# Patient Record
Sex: FEMALE | Race: WHITE | Employment: UNEMPLOYED | URBAN - NONMETROPOLITAN AREA
[De-identification: names, ages, dates, MRNs, and addresses within clinical notes are randomized per-mention and may not be internally consistent; named-entity substitution may affect disease eponyms.]

---

## 2018-01-24 ENCOUNTER — APPOINTMENT (OUTPATIENT)
Dept: CT IMAGING | Age: 58
End: 2018-01-24
Payer: MEDICAID

## 2018-01-24 ENCOUNTER — HOSPITAL ENCOUNTER (EMERGENCY)
Age: 58
Discharge: HOME | End: 2018-01-24
Attending: EMERGENCY MEDICINE
Payer: MEDICAID

## 2018-01-24 VITALS
HEIGHT: 67 IN | HEART RATE: 64 BPM | OXYGEN SATURATION: 98 % | WEIGHT: 140 LBS | DIASTOLIC BLOOD PRESSURE: 88 MMHG | RESPIRATION RATE: 16 BRPM | SYSTOLIC BLOOD PRESSURE: 160 MMHG | TEMPERATURE: 98 F | BODY MASS INDEX: 21.97 KG/M2

## 2018-01-24 DIAGNOSIS — R51.9 NONINTRACTABLE HEADACHE, UNSPECIFIED CHRONICITY PATTERN, UNSPECIFIED HEADACHE TYPE: Primary | ICD-10-CM

## 2018-01-24 PROCEDURE — 6360000002 HC RX W HCPCS: Performed by: EMERGENCY MEDICINE

## 2018-01-24 PROCEDURE — 6370000000 HC RX 637 (ALT 250 FOR IP): Performed by: EMERGENCY MEDICINE

## 2018-01-24 PROCEDURE — 99284 EMERGENCY DEPT VISIT MOD MDM: CPT

## 2018-01-24 PROCEDURE — 70450 CT HEAD/BRAIN W/O DYE: CPT

## 2018-01-24 PROCEDURE — 96372 THER/PROPH/DIAG INJ SC/IM: CPT

## 2018-01-24 PROCEDURE — 99283 EMERGENCY DEPT VISIT LOW MDM: CPT | Performed by: EMERGENCY MEDICINE

## 2018-01-24 RX ORDER — MORPHINE SULFATE 10 MG/ML
5 INJECTION, SOLUTION INTRAMUSCULAR; INTRAVENOUS ONCE
Status: COMPLETED | OUTPATIENT
Start: 2018-01-24 | End: 2018-01-24

## 2018-01-24 RX ORDER — ONDANSETRON 4 MG/1
4 TABLET, ORALLY DISINTEGRATING ORAL EVERY 8 HOURS PRN
Qty: 30 TABLET | Refills: 0 | Status: SHIPPED | OUTPATIENT
Start: 2018-01-24 | End: 2018-10-25

## 2018-01-24 RX ORDER — KETOROLAC TROMETHAMINE 30 MG/ML
30 INJECTION, SOLUTION INTRAMUSCULAR; INTRAVENOUS ONCE
Status: COMPLETED | OUTPATIENT
Start: 2018-01-24 | End: 2018-01-24

## 2018-01-24 RX ORDER — SUMATRIPTAN 6 MG/.5ML
6 INJECTION, SOLUTION SUBCUTANEOUS ONCE
Status: COMPLETED | OUTPATIENT
Start: 2018-01-24 | End: 2018-01-24

## 2018-01-24 RX ORDER — HYDROCODONE BITARTRATE AND ACETAMINOPHEN 5; 325 MG/1; MG/1
1 TABLET ORAL EVERY 6 HOURS PRN
Qty: 5 TABLET | Refills: 0 | Status: SHIPPED | OUTPATIENT
Start: 2018-01-24 | End: 2018-01-27

## 2018-01-24 RX ORDER — ONDANSETRON 4 MG/1
4 TABLET, ORALLY DISINTEGRATING ORAL ONCE
Status: COMPLETED | OUTPATIENT
Start: 2018-01-24 | End: 2018-01-24

## 2018-01-24 RX ORDER — DIPHENHYDRAMINE HYDROCHLORIDE 50 MG/ML
25 INJECTION INTRAMUSCULAR; INTRAVENOUS ONCE
Status: DISCONTINUED | OUTPATIENT
Start: 2018-01-24 | End: 2018-01-25 | Stop reason: HOSPADM

## 2018-01-24 RX ORDER — NAPROXEN 500 MG/1
500 TABLET ORAL 2 TIMES DAILY WITH MEALS
Qty: 60 TABLET | Refills: 0 | Status: SHIPPED | OUTPATIENT
Start: 2018-01-24 | End: 2018-10-25

## 2018-01-24 RX ORDER — OXYCODONE HYDROCHLORIDE AND ACETAMINOPHEN 5; 325 MG/1; MG/1
1 TABLET ORAL ONCE
Status: COMPLETED | OUTPATIENT
Start: 2018-01-24 | End: 2018-01-24

## 2018-01-24 RX ORDER — DEXAMETHASONE SODIUM PHOSPHATE 10 MG/ML
10 INJECTION, SOLUTION INTRAMUSCULAR; INTRAVENOUS ONCE
Status: COMPLETED | OUTPATIENT
Start: 2018-01-24 | End: 2018-01-24

## 2018-01-24 RX ADMIN — DEXAMETHASONE SODIUM PHOSPHATE 10 MG: 10 INJECTION, SOLUTION INTRAMUSCULAR; INTRAVENOUS at 21:10

## 2018-01-24 RX ADMIN — MORPHINE SULFATE 5 MG: 10 INJECTION INTRAVENOUS at 21:10

## 2018-01-24 RX ADMIN — MORPHINE SULFATE 5 MG: 10 INJECTION INTRAVENOUS at 21:46

## 2018-01-24 RX ADMIN — KETOROLAC TROMETHAMINE 30 MG: 30 INJECTION, SOLUTION INTRAMUSCULAR at 21:10

## 2018-01-24 RX ADMIN — SUMATRIPTAN SUCCINATE 6 MG: 6 INJECTION SUBCUTANEOUS at 20:44

## 2018-01-24 RX ADMIN — OXYCODONE HYDROCHLORIDE AND ACETAMINOPHEN 1 TABLET: 5; 325 TABLET ORAL at 21:46

## 2018-01-24 RX ADMIN — ONDANSETRON 4 MG: 4 TABLET, ORALLY DISINTEGRATING ORAL at 20:44

## 2018-01-24 ASSESSMENT — PAIN SCALES - GENERAL
PAINLEVEL_OUTOF10: 10
PAINLEVEL_OUTOF10: 7
PAINLEVEL_OUTOF10: 10

## 2018-01-24 ASSESSMENT — ENCOUNTER SYMPTOMS
ABDOMINAL PAIN: 0
EYE PAIN: 0
SHORTNESS OF BREATH: 0
PHOTOPHOBIA: 1
VOICE CHANGE: 0
VOMITING: 1
COUGH: 0
NAUSEA: 1
TROUBLE SWALLOWING: 0

## 2018-01-24 ASSESSMENT — PAIN DESCRIPTION - DESCRIPTORS: DESCRIPTORS: ACHING

## 2018-01-24 ASSESSMENT — PAIN DESCRIPTION - FREQUENCY: FREQUENCY: CONTINUOUS

## 2018-01-24 ASSESSMENT — PAIN DESCRIPTION - LOCATION: LOCATION: HEAD

## 2018-01-24 ASSESSMENT — PAIN DESCRIPTION - PAIN TYPE: TYPE: ACUTE PAIN

## 2018-01-25 NOTE — ED NOTES
Patient states that she is no better after imitrex shot; she was offered an IV but refused.  MD notifed     Viviana Miller RN  01/24/18 9583

## 2018-01-25 NOTE — ED PROVIDER NOTES
[facility]  eMERGENCY dEPARTMENT eNCOUnter      Pt Name: Olivia Zhao  MRN: [id number]  [place] 1960  Date of evaluation: 1/24/2018  Provider: Audelia David MD    [address] [place] [address]       Chief Complaint   Patient presents with    Migraine     started yesterday    Emesis         HISTORY OF PRESENT ILLNESS   (Location/Symptom, Timing/Onset, Context/Setting, Quality, Duration, Modifying Factors, Severity)  Note limiting factors. Olivia Zhao is a 61 y.o. female who presents to the emergency department With headache and migraine that started yesterday afternoon. The patient states his been brought on by stress from the [employer] high [employer] shooting. She states it's been a long time since she had a migraine. It is a 10 out of 10 she's had nausea and vomiting she has some photophobia associated with it she has not been sick no fever she has no syncope as a throbbing sensation on the left side of her head she states this is a migraine she doesn't need a head CT she's had these in the past again she thinks stress brought on she has no syncope she has no ataxia she has no weakness in her arms or legs. The patient is asking for some Imitrex she states that worked in the past. She is allergic to Phenergan. The patient otherwise is neurologically normal.    The history is provided by the patient. Nursing Notes were reviewed. REVIEW OF SYSTEMS    (2-9 systems for level 4, 10 or more for level 5)     Review of Systems   Constitutional: Negative for fever. HENT: Negative for trouble swallowing and voice change. Eyes: Positive for photophobia. Negative for pain. Respiratory: Negative for cough and shortness of breath. Cardiovascular: Negative for chest pain. Gastrointestinal: Positive for nausea and vomiting. Negative for abdominal pain. Neurological: Positive for headaches.  Negative for dizziness, tremors, seizures, syncope, facial asymmetry, speech difficulty, weakness, light-headedness and numbness. Psychiatric/Behavioral: Negative for confusion. A complete review of systems was performed and is negative except as noted above in the HPI. PAST MEDICAL HISTORY     Past Medical History:   Diagnosis Date    Arthritis     Fracture          SURGICAL HISTORY       Past Surgical History:   Procedure Laterality Date     SECTION      CHOLECYSTECTOMY      HYSTERECTOMY           CURRENT MEDICATIONS       Previous Medications    No medications on file       ALLERGIES     Phenergan [promethazine hcl]    FAMILY HISTORY     History reviewed. No pertinent family history. SOCIAL HISTORY       Social History     Social History    Marital status:      Spouse name: N/A    Number of children: N/A    Years of education: N/A     Social History Main Topics    Smoking status: Never Smoker    Smokeless tobacco: Never Used    Alcohol use Yes    Drug use: No    Sexual activity: Not Asked     Other Topics Concern    None     Social History Narrative    None       SCREENINGS             PHYSICAL EXAM    (up to 7 for level 4, 8 or more for level 5)     ED Triage Vitals [18]   BP Temp Temp Source Pulse Resp SpO2 Height Weight   (!) 174/92 97.8 °F (36.6 °C) Oral 72 17 99 % 5' 7\" (1.702 m) 140 lb (63.5 kg)       Physical Exam   Constitutional: She is oriented to person, place, and time. She appears well-developed and well-nourished. In pain lights out in room   HENT:   Head: Normocephalic and atraumatic. Eyes: EOM are normal. Pupils are equal, round, and reactive to light. Neck: Normal range of motion. Neck supple. Cardiovascular: Normal rate, regular rhythm and normal heart sounds. Pulmonary/Chest: Effort normal and breath sounds normal.   Abdominal: Soft. There is no tenderness. Musculoskeletal: Normal range of motion. She exhibits no edema. Neurological: She is alert and oriented to person, place, and time.  No cranial nerve deficit or home.     Needs to get back in to a doctor and pain management. Feels better, looks better neuro normal.       10:39 PM  Much improved ct neg, will get pcp outpatient to follow up as well. Amount and/or Complexity of Data Reviewed  Tests in the radiology section of CPT®: ordered and reviewed    Patient Progress  Patient progress: improved        CONSULTS:  None    PROCEDURES:  Unless otherwise noted below, none     Procedures    FINAL IMPRESSION      1. Nonintractable headache, unspecified chronicity pattern, unspecified headache type          DISPOSITION/PLAN   DISPOSITION Discharge - Pending Orders Complete 01/24/2018 10:28:24 PM      PATIENT REFERRED TO:  Germaine Neumann MD  [address] [place] [address] [address] [facility] [phone]    Schedule an appointment as soon as possible for a visit   to get a primary doctor [facility] [address] [id number] [phone]  Schedule an appointment as soon as possible for a visit   to get a primary doctor      DISCHARGE MEDICATIONS:  New Prescriptions    HYDROCODONE-ACETAMINOPHEN (NORCO) 5-325 MG PER TABLET    Take 1 tablet by mouth every 6 hours as needed for Pain for up to 3 days . Take lowest dose possible to manage pain. NAPROXEN (NAPROSYN) 500 MG TABLET    Take 1 tablet by mouth 2 times daily (with meals)    ONDANSETRON (ZOFRAN ODT) 4 MG DISINTEGRATING TABLET    Take 1 tablet by mouth every 8 hours as needed for Nausea or Vomiting     Attestation: The Prescription Monitoring Report for this patient was reviewed today. ([id number]) Sobia Haynes MD)  Documentation: Possible medication side effects, risk of tolerance and/or dependence, and alternative treatments discussed., No signs of potential drug abuse or diversion identified.  Sobia Haynes MD)    (Please note that portions of this note were completed with a voice recognition program.  Efforts were made to edit the dictations but occasionally words are mis-transcribed.)    Preet Barnes MD (electronically signed)  Attending Emergency Physician         Preet Barnes MD  01/24/18 0060

## 2018-10-25 ENCOUNTER — HOSPITAL ENCOUNTER (INPATIENT)
Age: 58
LOS: 3 days | Discharge: HOME | DRG: 378 | End: 2018-10-28
Attending: EMERGENCY MEDICINE | Admitting: INTERNAL MEDICINE
Payer: MEDICAID

## 2018-10-25 ENCOUNTER — APPOINTMENT (OUTPATIENT)
Dept: GENERAL RADIOLOGY | Age: 58
DRG: 378 | End: 2018-10-25
Payer: MEDICAID

## 2018-10-25 ENCOUNTER — APPOINTMENT (OUTPATIENT)
Dept: CT IMAGING | Age: 58
DRG: 378 | End: 2018-10-25
Payer: MEDICAID

## 2018-10-25 DIAGNOSIS — K92.2 UPPER GI BLEED: Primary | ICD-10-CM

## 2018-10-25 DIAGNOSIS — R07.9 CHEST PAIN, UNSPECIFIED TYPE: ICD-10-CM

## 2018-10-25 PROBLEM — E86.0 DEHYDRATION, MILD: Status: ACTIVE | Noted: 2018-10-25

## 2018-10-25 PROBLEM — R07.89 CHEST PRESSURE: Status: ACTIVE | Noted: 2018-10-25

## 2018-10-25 PROBLEM — E87.6 HYPOKALEMIA: Status: ACTIVE | Noted: 2018-10-25

## 2018-10-25 PROBLEM — G89.29 CHRONIC LOW BACK PAIN: Chronic | Status: ACTIVE | Noted: 2018-10-25

## 2018-10-25 PROBLEM — R50.9 FEVER: Status: ACTIVE | Noted: 2018-10-25

## 2018-10-25 PROBLEM — M54.50 CHRONIC LOW BACK PAIN: Chronic | Status: ACTIVE | Noted: 2018-10-25

## 2018-10-25 PROBLEM — D64.89 OTHER SPECIFIED ANEMIAS: Status: ACTIVE | Noted: 2018-10-25

## 2018-10-25 PROBLEM — E87.3 ACUTE RESPIRATORY ALKALOSIS: Status: ACTIVE | Noted: 2018-10-25

## 2018-10-25 PROBLEM — D62 ACUTE BLOOD LOSS ANEMIA: Status: ACTIVE | Noted: 2018-10-25

## 2018-10-25 LAB
ABO/RH: NORMAL
ALBUMIN SERPL-MCNC: 4.3 G/DL (ref 3.5–5.2)
ALP BLD-CCNC: 90 U/L (ref 35–104)
ALT SERPL-CCNC: 21 U/L (ref 5–33)
ANION GAP SERPL CALCULATED.3IONS-SCNC: 17 MMOL/L (ref 7–19)
ANTIBODY SCREEN: NORMAL
AST SERPL-CCNC: 20 U/L (ref 5–32)
BASE EXCESS ARTERIAL: 1.5 MMOL/L (ref -2–2)
BASOPHILS ABSOLUTE: 0 K/UL (ref 0–0.2)
BASOPHILS RELATIVE PERCENT: 0.2 % (ref 0–1)
BILIRUB SERPL-MCNC: 0.5 MG/DL (ref 0.2–1.2)
BILIRUBIN URINE: ABNORMAL
BLOOD, URINE: ABNORMAL
BUN BLDV-MCNC: 42 MG/DL (ref 6–20)
C DIFFICILE TOXIN, EIA: NORMAL
CALCIUM SERPL-MCNC: 9.6 MG/DL (ref 8.6–10)
CARBOXYHEMOGLOBIN ARTERIAL: 1.8 % (ref 0–5)
CHLORIDE BLD-SCNC: 98 MMOL/L (ref 98–111)
CLARITY: CLEAR
CO2: 23 MMOL/L (ref 22–29)
COLOR: YELLOW
CREAT SERPL-MCNC: 0.7 MG/DL (ref 0.5–0.9)
EOSINOPHILS ABSOLUTE: 0 K/UL (ref 0–0.6)
EOSINOPHILS RELATIVE PERCENT: 0.1 % (ref 0–5)
GFR NON-AFRICAN AMERICAN: >60
GLUCOSE BLD-MCNC: 193 MG/DL (ref 74–109)
GLUCOSE URINE: NEGATIVE MG/DL
HCO3 ARTERIAL: 24.2 MMOL/L (ref 22–26)
HCT VFR BLD CALC: 33.3 % (ref 37–47)
HEMOGLOBIN, ART, EXTENDED: 11.4 G/DL (ref 12–16)
HEMOGLOBIN: 10.8 G/DL (ref 12–16)
KETONES, URINE: 15 MG/DL
LACTIC ACID: 1.3 MMOL/L (ref 0.5–1.9)
LEUKOCYTE ESTERASE, URINE: ABNORMAL
LYMPHOCYTES ABSOLUTE: 1.3 K/UL (ref 1.1–4.5)
LYMPHOCYTES RELATIVE PERCENT: 11.9 % (ref 20–40)
MCH RBC QN AUTO: 29.8 PG (ref 27–31)
MCHC RBC AUTO-ENTMCNC: 32.4 G/DL (ref 33–37)
MCV RBC AUTO: 91.7 FL (ref 81–99)
METHEMOGLOBIN ARTERIAL: 1.2 %
MONOCYTES ABSOLUTE: 0.4 K/UL (ref 0–0.9)
MONOCYTES RELATIVE PERCENT: 3.8 % (ref 0–10)
NEUTROPHILS ABSOLUTE: 8.8 K/UL (ref 1.5–7.5)
NEUTROPHILS RELATIVE PERCENT: 83.4 % (ref 50–65)
NITRITE, URINE: NEGATIVE
O2 CONTENT ARTERIAL: 15.3 ML/DL
O2 SAT, ARTERIAL: 94.9 %
O2 THERAPY: ABNORMAL
PCO2 ARTERIAL: 31 MMHG (ref 35–45)
PDW BLD-RTO: 13.7 % (ref 11.5–14.5)
PERFORMED ON: NORMAL
PERFORMED ON: NORMAL
PH ARTERIAL: 7.5 (ref 7.35–7.45)
PH UA: 5.5
PLATELET # BLD: 437 K/UL (ref 130–400)
PMV BLD AUTO: 9 FL (ref 9.4–12.3)
PO2 ARTERIAL: 77 MMHG (ref 80–100)
POC TROPONIN I: 0 NG/ML (ref 0–0.08)
POC TROPONIN I: 0.07 NG/ML (ref 0–0.08)
POTASSIUM SERPL-SCNC: 3.1 MMOL/L (ref 3.5–5)
POTASSIUM, WHOLE BLOOD: 3.1
PRO-BNP: 576 PG/ML (ref 0–900)
PROTEIN UA: 30 MG/DL
RBC # BLD: 3.63 M/UL (ref 4.2–5.4)
SODIUM BLD-SCNC: 138 MMOL/L (ref 136–145)
SPECIFIC GRAVITY UA: 1.01
TOTAL PROTEIN: 7.4 G/DL (ref 6.6–8.7)
URINE REFLEX TO CULTURE: YES
UROBILINOGEN, URINE: 0.2 E.U./DL
WBC # BLD: 10.6 K/UL (ref 4.8–10.8)

## 2018-10-25 PROCEDURE — 99285 EMERGENCY DEPT VISIT HI MDM: CPT | Performed by: NURSE PRACTITIONER

## 2018-10-25 PROCEDURE — 87086 URINE CULTURE/COLONY COUNT: CPT

## 2018-10-25 PROCEDURE — 1210000000 HC MED SURG R&B

## 2018-10-25 PROCEDURE — 93005 ELECTROCARDIOGRAM TRACING: CPT

## 2018-10-25 PROCEDURE — 6360000002 HC RX W HCPCS: Performed by: CLINICAL NURSE SPECIALIST

## 2018-10-25 PROCEDURE — 80053 COMPREHEN METABOLIC PANEL: CPT

## 2018-10-25 PROCEDURE — 6370000000 HC RX 637 (ALT 250 FOR IP): Performed by: NURSE PRACTITIONER

## 2018-10-25 PROCEDURE — 6360000004 HC RX CONTRAST MEDICATION: Performed by: NURSE PRACTITIONER

## 2018-10-25 PROCEDURE — 99285 EMERGENCY DEPT VISIT HI MDM: CPT

## 2018-10-25 PROCEDURE — 86901 BLOOD TYPING SEROLOGIC RH(D): CPT

## 2018-10-25 PROCEDURE — 82803 BLOOD GASES ANY COMBINATION: CPT

## 2018-10-25 PROCEDURE — 96375 TX/PRO/DX INJ NEW DRUG ADDON: CPT

## 2018-10-25 PROCEDURE — C9113 INJ PANTOPRAZOLE SODIUM, VIA: HCPCS | Performed by: NURSE PRACTITIONER

## 2018-10-25 PROCEDURE — 71275 CT ANGIOGRAPHY CHEST: CPT

## 2018-10-25 PROCEDURE — 71046 X-RAY EXAM CHEST 2 VIEWS: CPT

## 2018-10-25 PROCEDURE — 86850 RBC ANTIBODY SCREEN: CPT

## 2018-10-25 PROCEDURE — 83605 ASSAY OF LACTIC ACID: CPT

## 2018-10-25 PROCEDURE — 36415 COLL VENOUS BLD VENIPUNCTURE: CPT

## 2018-10-25 PROCEDURE — 84132 ASSAY OF SERUM POTASSIUM: CPT

## 2018-10-25 PROCEDURE — 6360000002 HC RX W HCPCS

## 2018-10-25 PROCEDURE — 6360000002 HC RX W HCPCS: Performed by: NURSE PRACTITIONER

## 2018-10-25 PROCEDURE — 83880 ASSAY OF NATRIURETIC PEPTIDE: CPT

## 2018-10-25 PROCEDURE — 85025 COMPLETE CBC W/AUTO DIFF WBC: CPT

## 2018-10-25 PROCEDURE — 86900 BLOOD TYPING SEROLOGIC ABO: CPT

## 2018-10-25 PROCEDURE — 2580000003 HC RX 258: Performed by: CLINICAL NURSE SPECIALIST

## 2018-10-25 PROCEDURE — 81003 URINALYSIS AUTO W/O SCOPE: CPT

## 2018-10-25 PROCEDURE — 36600 WITHDRAWAL OF ARTERIAL BLOOD: CPT

## 2018-10-25 PROCEDURE — 87324 CLOSTRIDIUM AG IA: CPT

## 2018-10-25 PROCEDURE — 2580000003 HC RX 258: Performed by: NURSE PRACTITIONER

## 2018-10-25 PROCEDURE — 6370000000 HC RX 637 (ALT 250 FOR IP): Performed by: CLINICAL NURSE SPECIALIST

## 2018-10-25 PROCEDURE — 84484 ASSAY OF TROPONIN QUANT: CPT

## 2018-10-25 PROCEDURE — 96376 TX/PRO/DX INJ SAME DRUG ADON: CPT

## 2018-10-25 PROCEDURE — 96374 THER/PROPH/DIAG INJ IV PUSH: CPT

## 2018-10-25 PROCEDURE — 87040 BLOOD CULTURE FOR BACTERIA: CPT

## 2018-10-25 RX ORDER — MORPHINE SULFATE/0.9% NACL/PF 1 MG/ML
4 SYRINGE (ML) INJECTION ONCE
Status: COMPLETED | OUTPATIENT
Start: 2018-10-25 | End: 2018-10-25

## 2018-10-25 RX ORDER — GABAPENTIN 600 MG/1
600 TABLET ORAL
Status: DISCONTINUED | OUTPATIENT
Start: 2018-10-26 | End: 2018-10-28 | Stop reason: HOSPADM

## 2018-10-25 RX ORDER — SODIUM CHLORIDE 0.9 % (FLUSH) 0.9 %
10 SYRINGE (ML) INJECTION PRN
Status: DISCONTINUED | OUTPATIENT
Start: 2018-10-25 | End: 2018-10-28 | Stop reason: HOSPADM

## 2018-10-25 RX ORDER — ONDANSETRON 2 MG/ML
INJECTION INTRAMUSCULAR; INTRAVENOUS
Status: COMPLETED
Start: 2018-10-25 | End: 2018-10-25

## 2018-10-25 RX ORDER — OXYCODONE AND ACETAMINOPHEN 7.5; 325 MG/1; MG/1
1 TABLET ORAL EVERY 4 HOURS PRN
Status: ON HOLD | COMMUNITY
End: 2019-05-30 | Stop reason: ALTCHOICE

## 2018-10-25 RX ORDER — PANTOPRAZOLE SODIUM 40 MG/10ML
40 INJECTION, POWDER, LYOPHILIZED, FOR SOLUTION INTRAVENOUS DAILY
Status: DISCONTINUED | OUTPATIENT
Start: 2018-10-25 | End: 2018-10-25

## 2018-10-25 RX ORDER — SODIUM CHLORIDE 9 MG/ML
INJECTION, SOLUTION INTRAVENOUS CONTINUOUS
Status: DISCONTINUED | OUTPATIENT
Start: 2018-10-25 | End: 2018-10-28

## 2018-10-25 RX ORDER — ACETAMINOPHEN 325 MG/1
650 TABLET ORAL ONCE
Status: DISCONTINUED | OUTPATIENT
Start: 2018-10-25 | End: 2018-10-28 | Stop reason: HOSPADM

## 2018-10-25 RX ORDER — ACETAMINOPHEN 325 MG/1
650 TABLET ORAL EVERY 4 HOURS PRN
Status: DISCONTINUED | OUTPATIENT
Start: 2018-10-25 | End: 2018-10-28 | Stop reason: HOSPADM

## 2018-10-25 RX ORDER — ONDANSETRON 2 MG/ML
4 INJECTION INTRAMUSCULAR; INTRAVENOUS ONCE
Status: COMPLETED | OUTPATIENT
Start: 2018-10-25 | End: 2018-10-25

## 2018-10-25 RX ORDER — POTASSIUM BICARBONATE 25 MEQ/1
25 TABLET, EFFERVESCENT ORAL ONCE
Status: COMPLETED | OUTPATIENT
Start: 2018-10-25 | End: 2018-10-25

## 2018-10-25 RX ORDER — GABAPENTIN 600 MG/1
600 TABLET ORAL 2 TIMES DAILY
COMMUNITY

## 2018-10-25 RX ORDER — SODIUM CHLORIDE 0.9 % (FLUSH) 0.9 %
10 SYRINGE (ML) INJECTION EVERY 12 HOURS SCHEDULED
Status: DISCONTINUED | OUTPATIENT
Start: 2018-10-25 | End: 2018-10-28 | Stop reason: HOSPADM

## 2018-10-25 RX ORDER — ONDANSETRON 2 MG/ML
4 INJECTION INTRAMUSCULAR; INTRAVENOUS EVERY 6 HOURS PRN
Status: DISCONTINUED | OUTPATIENT
Start: 2018-10-25 | End: 2018-10-28 | Stop reason: HOSPADM

## 2018-10-25 RX ORDER — ASPIRIN 325 MG
325 TABLET ORAL ONCE
Status: DISCONTINUED | OUTPATIENT
Start: 2018-10-25 | End: 2018-10-25

## 2018-10-25 RX ORDER — 0.9 % SODIUM CHLORIDE 0.9 %
250 INTRAVENOUS SOLUTION INTRAVENOUS ONCE
Status: COMPLETED | OUTPATIENT
Start: 2018-10-25 | End: 2018-10-25

## 2018-10-25 RX ORDER — NITROGLYCERIN 0.4 MG/1
0.4 TABLET SUBLINGUAL EVERY 5 MIN PRN
Status: DISCONTINUED | OUTPATIENT
Start: 2018-10-25 | End: 2018-10-28 | Stop reason: HOSPADM

## 2018-10-25 RX ADMIN — Medication 4 MG: at 11:55

## 2018-10-25 RX ADMIN — IOPAMIDOL 90 ML: 755 INJECTION, SOLUTION INTRAVENOUS at 12:41

## 2018-10-25 RX ADMIN — Medication 0.5 MG: at 19:21

## 2018-10-25 RX ADMIN — SODIUM CHLORIDE: 9 INJECTION, SOLUTION INTRAVENOUS at 19:14

## 2018-10-25 RX ADMIN — NITROGLYCERIN 0.4 MG: 0.4 TABLET, ORALLY DISINTEGRATING SUBLINGUAL at 09:28

## 2018-10-25 RX ADMIN — ONDANSETRON 4 MG: 2 INJECTION INTRAMUSCULAR; INTRAVENOUS at 09:00

## 2018-10-25 RX ADMIN — SODIUM CHLORIDE 250 ML: 9 INJECTION, SOLUTION INTRAVENOUS at 09:00

## 2018-10-25 RX ADMIN — POTASSIUM BICARBONATE 25 MEQ: 25 TABLET, EFFERVESCENT ORAL at 19:36

## 2018-10-25 RX ADMIN — SODIUM CHLORIDE 8 MG/HR: 9 INJECTION, SOLUTION INTRAVENOUS at 16:06

## 2018-10-25 RX ADMIN — Medication 4 MG: at 16:17

## 2018-10-25 RX ADMIN — Medication 4 MG: at 08:49

## 2018-10-25 ASSESSMENT — PAIN SCALES - GENERAL
PAINLEVEL_OUTOF10: 8
PAINLEVEL_OUTOF10: 0
PAINLEVEL_OUTOF10: 8
PAINLEVEL_OUTOF10: 4
PAINLEVEL_OUTOF10: 8
PAINLEVEL_OUTOF10: 7
PAINLEVEL_OUTOF10: 9
PAINLEVEL_OUTOF10: 8

## 2018-10-25 ASSESSMENT — ENCOUNTER SYMPTOMS
ABDOMINAL PAIN: 1
SHORTNESS OF BREATH: 1
ANAL BLEEDING: 1
DIARRHEA: 1
NAUSEA: 1
EYES NEGATIVE: 1

## 2018-10-25 ASSESSMENT — HEART SCORE: ECG: 1

## 2018-10-25 NOTE — ED TRIAGE NOTES
PT presents to ED via EMS from home for complaint of chest pain that began yesterday. PT reports a dull ache in the center of her chest, rates pain 8/10 at this time. PT received 325 mg aspirin and 1 SL nitro via EMS PTA. PT also reports nausea and shortness of breath.

## 2018-10-25 NOTE — ED PROVIDER NOTES
for Pain. Nataly Jon ALLERGIES     Benadryl [diphenhydramine] and Phenergan [promethazine hcl]    FAMILY HISTORY     No family history on file. SOCIAL HISTORY       Social History     Social History    Marital status:      Spouse name: N/A    Number of children: N/A    Years of education: N/A     Social History Main Topics    Smoking status: Never Smoker    Smokeless tobacco: Never Used    Alcohol use Yes    Drug use: No    Sexual activity: Not on file     Other Topics Concern    Not on file     Social History Narrative    No narrative on file       SCREENINGS      Heart Score for chest pain patients  History: Slightly Suspicious  ECG: Non-Specifc repolarization disturbance/LBTB/PM  Patient Age: > 39 and < 65 years  *Risk factors for Atherosclerotic disease: Positive family History  Risk Factors: 1 or 2 risk factors  Troponin: < 1X normal limit  Heart Score Total: 3      PHYSICAL EXAM    (up to 7 for level 4, 8 or more for level 5)     ED Triage Vitals [10/25/18 0817]   BP Temp Temp Source Pulse Resp SpO2 Height Weight   129/88 98.6 °F (37 °C) Oral 104 18 93 % 5' 7\" (1.702 m) 140 lb (63.5 kg)       Physical Exam   Constitutional: She is oriented to person, place, and time. She appears well-nourished. HENT:   Head: Normocephalic. Right Ear: External ear normal.   Left Ear: External ear normal.   Mouth/Throat: Oropharynx is clear and moist.   Eyes: EOM are normal.   Neck: Normal range of motion. Cardiovascular: Normal rate and regular rhythm. Pulmonary/Chest: Effort normal.   Abdominal: Soft. There is no tenderness. Genitourinary: Rectal exam shows guaiac positive stool. Genitourinary Comments: Melena stool noted   Musculoskeletal: Normal range of motion. Neurological: She is alert and oriented to person, place, and time. Vitals reviewed.       DIAGNOSTIC RESULTS     EKG: All EKG's are interpreted by the Emergency Department Physician who either signs or Co-signs this chart in the

## 2018-10-25 NOTE — PLAN OF CARE
Problem: Activity:  Goal: Risk for activity intolerance will decrease  Risk for activity intolerance will decrease  Outcome: Ongoing      Problem:  Bowel/Gastric:  Goal: Bowel function will improve  Bowel function will improve  Outcome: Ongoing    Goal: Diagnostic test results will improve  Diagnostic test results will improve  Outcome: Ongoing    Goal: Occurrences of nausea will decrease  Occurrences of nausea will decrease  Outcome: Ongoing    Goal: Occurrences of vomiting will decrease  Occurrences of vomiting will decrease  Outcome: Ongoing      Problem: Fluid Volume:  Goal: Maintenance of adequate hydration will improve  Maintenance of adequate hydration will improve  Outcome: Ongoing      Problem: Health Behavior:  Goal: Ability to state signs and symptoms to report to health care provider will improve  Ability to state signs and symptoms to report to health care provider will improve  Outcome: Ongoing      Problem: Physical Regulation:  Goal: Complications related to the disease process, condition or treatment will be avoided or minimized  Complications related to the disease process, condition or treatment will be avoided or minimized  Outcome: Ongoing    Goal: Ability to maintain clinical measurements within normal limits will improve  Ability to maintain clinical measurements within normal limits will improve  Outcome: Ongoing

## 2018-10-26 ENCOUNTER — ANESTHESIA EVENT (OUTPATIENT)
Dept: ENDOSCOPY | Age: 58
DRG: 378 | End: 2018-10-26
Payer: MEDICAID

## 2018-10-26 ENCOUNTER — ANESTHESIA (OUTPATIENT)
Dept: ENDOSCOPY | Age: 58
DRG: 378 | End: 2018-10-26
Payer: MEDICAID

## 2018-10-26 VITALS
SYSTOLIC BLOOD PRESSURE: 121 MMHG | OXYGEN SATURATION: 98 % | RESPIRATION RATE: 15 BRPM | DIASTOLIC BLOOD PRESSURE: 60 MMHG

## 2018-10-26 LAB
ANION GAP SERPL CALCULATED.3IONS-SCNC: 11 MMOL/L (ref 7–19)
BUN BLDV-MCNC: 20 MG/DL (ref 6–20)
CALCIUM SERPL-MCNC: 8.4 MG/DL (ref 8.6–10)
CHLORIDE BLD-SCNC: 105 MMOL/L (ref 98–111)
CO2: 22 MMOL/L (ref 22–29)
CREAT SERPL-MCNC: 0.6 MG/DL (ref 0.5–0.9)
EKG P AXIS: 18 DEGREES
EKG P AXIS: 60 DEGREES
EKG P AXIS: 9 DEGREES
EKG P-R INTERVAL: 114 MS
EKG P-R INTERVAL: 122 MS
EKG P-R INTERVAL: 134 MS
EKG Q-T INTERVAL: 396 MS
EKG Q-T INTERVAL: 408 MS
EKG Q-T INTERVAL: 412 MS
EKG QRS DURATION: 100 MS
EKG QRS DURATION: 106 MS
EKG QRS DURATION: 96 MS
EKG QTC CALCULATION (BAZETT): 443 MS
EKG QTC CALCULATION (BAZETT): 453 MS
EKG QTC CALCULATION (BAZETT): 458 MS
EKG T AXIS: 36 DEGREES
EKG T AXIS: 39 DEGREES
EKG T AXIS: 41 DEGREES
FERRITIN: 101.8 NG/ML (ref 13–150)
GFR NON-AFRICAN AMERICAN: >60
GLUCOSE BLD-MCNC: 108 MG/DL (ref 74–109)
HCT VFR BLD CALC: 23.4 % (ref 37–47)
HCT VFR BLD CALC: 24 % (ref 37–47)
HCT VFR BLD CALC: 25.2 % (ref 37–47)
HEMOGLOBIN: 7.4 G/DL (ref 12–16)
HEMOGLOBIN: 7.6 G/DL (ref 12–16)
HEMOGLOBIN: 8.1 G/DL (ref 12–16)
IRON SATURATION: 28 % (ref 14–50)
IRON: 71 UG/DL (ref 37–145)
MCH RBC QN AUTO: 30.1 PG (ref 27–31)
MCH RBC QN AUTO: 30.2 PG (ref 27–31)
MCH RBC QN AUTO: 30.3 PG (ref 27–31)
MCHC RBC AUTO-ENTMCNC: 31.6 G/DL (ref 33–37)
MCHC RBC AUTO-ENTMCNC: 31.7 G/DL (ref 33–37)
MCHC RBC AUTO-ENTMCNC: 32.1 G/DL (ref 33–37)
MCV RBC AUTO: 93.7 FL (ref 81–99)
MCV RBC AUTO: 95.2 FL (ref 81–99)
MCV RBC AUTO: 95.9 FL (ref 81–99)
PDW BLD-RTO: 14.3 % (ref 11.5–14.5)
PDW BLD-RTO: 14.3 % (ref 11.5–14.5)
PDW BLD-RTO: 14.4 % (ref 11.5–14.5)
PLATELET # BLD: 313 K/UL (ref 130–400)
PLATELET # BLD: 328 K/UL (ref 130–400)
PLATELET # BLD: 358 K/UL (ref 130–400)
PMV BLD AUTO: 8.9 FL (ref 9.4–12.3)
PMV BLD AUTO: 9 FL (ref 9.4–12.3)
PMV BLD AUTO: 9.2 FL (ref 9.4–12.3)
POTASSIUM REFLEX MAGNESIUM: 3.8 MMOL/L (ref 3.5–5)
RBC # BLD: 2.44 M/UL (ref 4.2–5.4)
RBC # BLD: 2.52 M/UL (ref 4.2–5.4)
RBC # BLD: 2.69 M/UL (ref 4.2–5.4)
SODIUM BLD-SCNC: 138 MMOL/L (ref 136–145)
TOTAL IRON BINDING CAPACITY: 256 UG/DL (ref 250–400)
WBC # BLD: 10.2 K/UL (ref 4.8–10.8)
WBC # BLD: 10.9 K/UL (ref 4.8–10.8)
WBC # BLD: 11.2 K/UL (ref 4.8–10.8)

## 2018-10-26 PROCEDURE — 7100000001 HC PACU RECOVERY - ADDTL 15 MIN: Performed by: INTERNAL MEDICINE

## 2018-10-26 PROCEDURE — 6370000000 HC RX 637 (ALT 250 FOR IP): Performed by: CLINICAL NURSE SPECIALIST

## 2018-10-26 PROCEDURE — 0DB78ZX EXCISION OF STOMACH, PYLORUS, VIA NATURAL OR ARTIFICIAL OPENING ENDOSCOPIC, DIAGNOSTIC: ICD-10-PCS | Performed by: INTERNAL MEDICINE

## 2018-10-26 PROCEDURE — 2500000003 HC RX 250 WO HCPCS: Performed by: NURSE ANESTHETIST, CERTIFIED REGISTERED

## 2018-10-26 PROCEDURE — 36415 COLL VENOUS BLD VENIPUNCTURE: CPT

## 2018-10-26 PROCEDURE — 2580000003 HC RX 258: Performed by: NURSE PRACTITIONER

## 2018-10-26 PROCEDURE — 83540 ASSAY OF IRON: CPT

## 2018-10-26 PROCEDURE — 88342 IMHCHEM/IMCYTCHM 1ST ANTB: CPT

## 2018-10-26 PROCEDURE — 6360000002 HC RX W HCPCS: Performed by: NURSE PRACTITIONER

## 2018-10-26 PROCEDURE — 83550 IRON BINDING TEST: CPT

## 2018-10-26 PROCEDURE — 6360000002 HC RX W HCPCS: Performed by: CLINICAL NURSE SPECIALIST

## 2018-10-26 PROCEDURE — 6360000002 HC RX W HCPCS: Performed by: NURSE ANESTHETIST, CERTIFIED REGISTERED

## 2018-10-26 PROCEDURE — 2580000003 HC RX 258: Performed by: NURSE ANESTHETIST, CERTIFIED REGISTERED

## 2018-10-26 PROCEDURE — 85027 COMPLETE CBC AUTOMATED: CPT

## 2018-10-26 PROCEDURE — 3700000000 HC ANESTHESIA ATTENDED CARE: Performed by: INTERNAL MEDICINE

## 2018-10-26 PROCEDURE — 2709999900 HC NON-CHARGEABLE SUPPLY: Performed by: INTERNAL MEDICINE

## 2018-10-26 PROCEDURE — 6360000002 HC RX W HCPCS: Performed by: INTERNAL MEDICINE

## 2018-10-26 PROCEDURE — C9113 INJ PANTOPRAZOLE SODIUM, VIA: HCPCS | Performed by: INTERNAL MEDICINE

## 2018-10-26 PROCEDURE — 6370000000 HC RX 637 (ALT 250 FOR IP): Performed by: INTERNAL MEDICINE

## 2018-10-26 PROCEDURE — 3609012400 HC EGD TRANSORAL BIOPSY SINGLE/MULTIPLE: Performed by: INTERNAL MEDICINE

## 2018-10-26 PROCEDURE — 2580000003 HC RX 258: Performed by: INTERNAL MEDICINE

## 2018-10-26 PROCEDURE — 82728 ASSAY OF FERRITIN: CPT

## 2018-10-26 PROCEDURE — 99233 SBSQ HOSP IP/OBS HIGH 50: CPT | Performed by: INTERNAL MEDICINE

## 2018-10-26 PROCEDURE — 2580000003 HC RX 258: Performed by: CLINICAL NURSE SPECIALIST

## 2018-10-26 PROCEDURE — 3700000001 HC ADD 15 MINUTES (ANESTHESIA): Performed by: INTERNAL MEDICINE

## 2018-10-26 PROCEDURE — 1210000000 HC MED SURG R&B

## 2018-10-26 PROCEDURE — 88305 TISSUE EXAM BY PATHOLOGIST: CPT

## 2018-10-26 PROCEDURE — 7100000000 HC PACU RECOVERY - FIRST 15 MIN: Performed by: INTERNAL MEDICINE

## 2018-10-26 PROCEDURE — 93005 ELECTROCARDIOGRAM TRACING: CPT

## 2018-10-26 PROCEDURE — 80048 BASIC METABOLIC PNL TOTAL CA: CPT

## 2018-10-26 PROCEDURE — C9113 INJ PANTOPRAZOLE SODIUM, VIA: HCPCS | Performed by: NURSE PRACTITIONER

## 2018-10-26 RX ORDER — FENTANYL CITRATE 50 UG/ML
INJECTION, SOLUTION INTRAMUSCULAR; INTRAVENOUS PRN
Status: DISCONTINUED | OUTPATIENT
Start: 2018-10-26 | End: 2018-10-26 | Stop reason: SDUPTHER

## 2018-10-26 RX ORDER — PANTOPRAZOLE SODIUM 40 MG/10ML
40 INJECTION, POWDER, LYOPHILIZED, FOR SOLUTION INTRAVENOUS 2 TIMES DAILY
Status: DISCONTINUED | OUTPATIENT
Start: 2018-10-26 | End: 2018-10-28 | Stop reason: HOSPADM

## 2018-10-26 RX ORDER — SUCRALFATE 1 G/1
1 TABLET ORAL EVERY 6 HOURS SCHEDULED
Status: DISCONTINUED | OUTPATIENT
Start: 2018-10-26 | End: 2018-10-28 | Stop reason: HOSPADM

## 2018-10-26 RX ORDER — PROPOFOL 10 MG/ML
INJECTION, EMULSION INTRAVENOUS PRN
Status: DISCONTINUED | OUTPATIENT
Start: 2018-10-26 | End: 2018-10-26 | Stop reason: SDUPTHER

## 2018-10-26 RX ORDER — LIDOCAINE HYDROCHLORIDE 10 MG/ML
INJECTION, SOLUTION INFILTRATION; PERINEURAL PRN
Status: DISCONTINUED | OUTPATIENT
Start: 2018-10-26 | End: 2018-10-26 | Stop reason: SDUPTHER

## 2018-10-26 RX ORDER — OXYCODONE AND ACETAMINOPHEN 7.5; 325 MG/1; MG/1
1 TABLET ORAL EVERY 6 HOURS PRN
Status: DISCONTINUED | OUTPATIENT
Start: 2018-10-26 | End: 2018-10-27

## 2018-10-26 RX ORDER — ONDANSETRON 2 MG/ML
4 INJECTION INTRAMUSCULAR; INTRAVENOUS
Status: DISCONTINUED | OUTPATIENT
Start: 2018-10-26 | End: 2018-10-26 | Stop reason: HOSPADM

## 2018-10-26 RX ORDER — SODIUM CHLORIDE, SODIUM LACTATE, POTASSIUM CHLORIDE, CALCIUM CHLORIDE 600; 310; 30; 20 MG/100ML; MG/100ML; MG/100ML; MG/100ML
INJECTION, SOLUTION INTRAVENOUS CONTINUOUS PRN
Status: DISCONTINUED | OUTPATIENT
Start: 2018-10-26 | End: 2018-10-26 | Stop reason: SDUPTHER

## 2018-10-26 RX ADMIN — OXYCODONE HYDROCHLORIDE AND ACETAMINOPHEN 1 TABLET: 7.5; 325 TABLET ORAL at 23:31

## 2018-10-26 RX ADMIN — Medication 0.5 MG: at 00:02

## 2018-10-26 RX ADMIN — PANTOPRAZOLE SODIUM 40 MG: 40 INJECTION, POWDER, FOR SOLUTION INTRAVENOUS at 11:40

## 2018-10-26 RX ADMIN — FENTANYL CITRATE 50 MCG: 50 INJECTION INTRAMUSCULAR; INTRAVENOUS at 09:53

## 2018-10-26 RX ADMIN — SUCRALFATE 1 G: 1 TABLET ORAL at 23:31

## 2018-10-26 RX ADMIN — SUCRALFATE 1 G: 1 TABLET ORAL at 11:40

## 2018-10-26 RX ADMIN — Medication 0.5 MG: at 08:12

## 2018-10-26 RX ADMIN — Medication 10 ML: at 08:13

## 2018-10-26 RX ADMIN — GABAPENTIN 600 MG: 600 TABLET, FILM COATED ORAL at 20:24

## 2018-10-26 RX ADMIN — GABAPENTIN 600 MG: 600 TABLET, FILM COATED ORAL at 13:08

## 2018-10-26 RX ADMIN — Medication 10 ML: at 20:24

## 2018-10-26 RX ADMIN — SODIUM CHLORIDE: 9 INJECTION, SOLUTION INTRAVENOUS at 13:08

## 2018-10-26 RX ADMIN — OXYCODONE HYDROCHLORIDE AND ACETAMINOPHEN 1 TABLET: 7.5; 325 TABLET ORAL at 17:53

## 2018-10-26 RX ADMIN — OXYCODONE HYDROCHLORIDE AND ACETAMINOPHEN 1 TABLET: 7.5; 325 TABLET ORAL at 11:48

## 2018-10-26 RX ADMIN — PROPOFOL 50 MG: 10 INJECTION, EMULSION INTRAVENOUS at 09:58

## 2018-10-26 RX ADMIN — GABAPENTIN 600 MG: 600 TABLET, FILM COATED ORAL at 08:12

## 2018-10-26 RX ADMIN — Medication 0.5 MG: at 03:59

## 2018-10-26 RX ADMIN — GABAPENTIN 600 MG: 600 TABLET, FILM COATED ORAL at 17:53

## 2018-10-26 RX ADMIN — SODIUM CHLORIDE, SODIUM LACTATE, POTASSIUM CHLORIDE, AND CALCIUM CHLORIDE: 600; 310; 30; 20 INJECTION, SOLUTION INTRAVENOUS at 09:52

## 2018-10-26 RX ADMIN — PANTOPRAZOLE SODIUM 40 MG: 40 INJECTION, POWDER, FOR SOLUTION INTRAVENOUS at 20:24

## 2018-10-26 RX ADMIN — Medication 1 MG: at 20:24

## 2018-10-26 RX ADMIN — PROPOFOL 70 MG: 10 INJECTION, EMULSION INTRAVENOUS at 09:53

## 2018-10-26 RX ADMIN — SODIUM CHLORIDE: 9 INJECTION, SOLUTION INTRAVENOUS at 23:32

## 2018-10-26 RX ADMIN — SODIUM CHLORIDE 8 MG/HR: 9 INJECTION, SOLUTION INTRAVENOUS at 00:22

## 2018-10-26 RX ADMIN — SUCRALFATE 1 G: 1 TABLET ORAL at 17:53

## 2018-10-26 RX ADMIN — LIDOCAINE HYDROCHLORIDE 50 MG: 10 INJECTION, SOLUTION INFILTRATION; PERINEURAL at 09:53

## 2018-10-26 ASSESSMENT — PAIN SCALES - GENERAL
PAINLEVEL_OUTOF10: 8
PAINLEVEL_OUTOF10: 3
PAINLEVEL_OUTOF10: 7
PAINLEVEL_OUTOF10: 9
PAINLEVEL_OUTOF10: 8
PAINLEVEL_OUTOF10: 7
PAINLEVEL_OUTOF10: 8
PAINLEVEL_OUTOF10: 3
PAINLEVEL_OUTOF10: 8

## 2018-10-26 ASSESSMENT — ENCOUNTER SYMPTOMS
EYES NEGATIVE: 1
WHEEZING: 1
BACK PAIN: 1
DIARRHEA: 1
ABDOMINAL PAIN: 1

## 2018-10-26 ASSESSMENT — PAIN DESCRIPTION - PAIN TYPE: TYPE: ACUTE PAIN

## 2018-10-26 ASSESSMENT — PAIN DESCRIPTION - LOCATION: LOCATION: ABDOMEN

## 2018-10-26 NOTE — PLAN OF CARE
Problem: Nutrition  Goal: Optimal nutrition therapy  Nutrition Problem: Inadequate oral intake  Intervention: Food and/or Nutrient Delivery: Continue current diet, Start ONS  Nutritional Goals: Pt will consume 50% or more of all meals and supplements    Outcome: Ongoing

## 2018-10-26 NOTE — PLAN OF CARE
Problem: Activity:  Goal: Risk for activity intolerance will decrease  Risk for activity intolerance will decrease   Outcome: Ongoing      Problem:  Bowel/Gastric:  Goal: Occurrences of nausea will decrease  Occurrences of nausea will decrease   Outcome: Ongoing    Goal: Occurrences of vomiting will decrease  Occurrences of vomiting will decrease   Outcome: Ongoing      Problem: Fluid Volume:  Goal: Maintenance of adequate hydration will improve  Maintenance of adequate hydration will improve   Outcome: Ongoing      Problem: Physical Regulation:  Goal: Ability to maintain clinical measurements within normal limits will improve  Ability to maintain clinical measurements within normal limits will improve   Outcome: Ongoing

## 2018-10-26 NOTE — PROGRESS NOTES
Lauri Will, APRN        Wheeler Jamil Hold] acetaminophen (TYLENOL) tablet 650 mg  650 mg Oral Q4H PRN Lauri Will, APRN        Wheeler Jamil Hold] ondansetron [facility]) injection 4 mg  4 mg Intravenous Q6H PRN Lauri Will, APRN        Wheeler Jamil Hold] 0.9 % sodium chloride infusion   Intravenous Continuous Lauri Will, APRN 150 mL/hr at 10/25/18 1914      [MAR Hold] HYDROmorphone (DILAUDID) injection 0.25 mg  0.25 mg Intravenous Q3H PRN Lauri Will, APRN        Or   Gladis Polite Hold] HYDROmorphone (DILAUDID) injection 0.5 mg  0.5 mg Intravenous Q3H PRN Lauri Will, APRN   0.5 mg at 10/26/18 6528    [MAR Hold] gabapentin (NEURONTIN) tablet 600 mg  600 mg Oral 4x Daily PC & HS Lauri Will, APRN   600 mg at 10/26/18 5577     Facility-Administered Medications Ordered in Other Encounters   Medication Dose Route Frequency Provider Last Rate Last Dose    propofol injection    PRN Toñito Carey, APRN - CRNA   50 mg at 10/26/18 6974    lidocaine 1 % injection    PRN Toñito Carey, APRN - CRNA   50 mg at 10/26/18 7247    fentaNYL (SUBLIMAZE) injection    PRN Toñito Carey, APRN - CRNA   50 mcg at 10/26/18 7501    lactated ringers infusion    Continuous PRN Toñito [place], APRN - CRNA        FentaNYL Waste 50 mcg    Continuous PRN Toñito Carey, APRN - CRNA            Labs:     Recent Labs      10/25/18   0824  10/25/18   2356   WBC  10.6  11.2*   RBC  3.63*  2.69*   HGB  10.8*  8.1*   HCT  33.3*  25.2*   MCV  91.7  93.7   MCH  29.8  30.1   MCHC  32.4*  32.1*   PLT  437*  358     Recent Labs      10/25/18   0824  10/25/18   0903  10/26/18   0233   NA  138   --   138   K  3.1*  3.1  3.8   ANIONGAP  17   --   11   CL  98   --   105   CO2  23   --   22   BUN  42*   --   20   CREATININE  0.7   --   0.6   GLUCOSE  193*   --   108   CALCIUM  9.6   --   8.4*     No results for input(s): MG, PHOS in the last 72 hours.   Recent Labs      10/25/18   0824   AST  20   ALT  21   BILITOT  0.5   ALKPHOS  90     ABGs:  Recent Labs      10/25/18   0903   PHART 7.500*   XBR3ENW  31.0*   PO2ART  77.0*   OIB9NEG  24.2   BEART  1.5   HGBAE  11.4*   I5PGGACU  94.9   CARBOXHGBART  1.8   02THERAPY  Unknown     HgBA1c: No results for input(s): LABA1C in the last 72 hours. FLP:  No results found for: TRIG, HDL, LDLCALC, LDLDIRECT, LABVLDL  TSH:  No results found for: TSH  Troponin T:   Recent Labs      10/25/18   0842  10/25/18   1114   TROPONINI  0.07  0.00     INR: No results for input(s): INR in the last 72 hours. Objective:   Vitals: /66   Pulse 86   Temp 98.8 °F (37.1 °C) (Temporal)   Resp 17   Ht 5' 7\" (1.702 m)   Wt 140 lb (63.5 kg)   SpO2 94%   BMI 21.93 kg/m²   24HR INTAKE/OUTPUT:    Intake/Output Summary (Last 24 hours) at 10/26/18 1001  Last data filed at 10/26/18 1000   Gross per 24 hour   Intake           1724.5 ml   Output             1250 ml   Net            474.5 ml     General appearance: NAD, alert and cooperative with exam  HEENT: atraumatic, PERRLA, EOMI, anicteric, trachea midline  Lungs: no increased work of breathing, CTAB, no wheezing/rhonchi/rales  Heart: RRR, +s1/s2, no rub  Abdomen: soft, nt/nd, +BS, no rebound/gaurding  Extremities: atraumatic, no edema, no cyanosis   Neurologic: AAOx3, no focal deficits  Skin: no rashes  Psych: Normal affect      Assessment and Plan:   Principal Problem:    Acute UGIB - from PUD and ulcers, s/p EGD this AM without any active bleeding noted. HGB dropped overnight from 10.8 to 8.1. Awaiting stat repeat cbc. Hemodynamically stable. Continue cbc q6 hrs. Check type and screen. Transfuse for hgb <7 or if pt develops chest pain or any has any further bleeding prior to next cbc. Can change ppi gtt to iv ppi bid and plan for oral ppi bid at d/c. Start carafate qid.      Active Problems:    Chest pressure - ekg non-acute and trop negative, likely secondary to PUD, monitor on tele    Hypokalemia - improved    Dehydration, mild - s/p ivf resus    Acute blood loss anemia - secondary to PUD, monitor    Chronic low back pain - continue home pain meds       Advance Directive: Full Code          Electronically signed by Gregg Elias DO on 10/26/2018 at 10:01 AM

## 2018-10-26 NOTE — CONSULTS
[facility] [place]  Cardiology Consult    Requesting MD:  Berenice Das DO Admit Status:  Inpatient       History obtained from:   [x] Patient  [x] Other (specify): Chart      Chief Complaint: Melena and diarrhea for several days  Chief Complaint   Patient presents with    Chest Pain     x2 days    Shortness of Breath    Nausea     This is a 69-year-old lady on occasion,  has had episodes of epigastric pain associated with dark stool. Last episode was 2 months ago and now recurrent for the last 3 days; she has not sought medical attention prior to this. She presented to the emergency room hemoglobin initially in the 10 range currently 8.1 may represent a dilutional factor. Patient's diarrhea is described as black and loose for several days days;  now with dark brown and loose. Her usual evacuations daily brown and formed. Patient takes no NSAIDs, no oral IRON,  no Pepto-Bismol Review of Systems:  Review of Systems   Constitutional: Negative. HENT: Negative. Eyes: Negative. Respiratory: Positive for wheezing. Patient has wheezing on occasion   Cardiovascular: Negative. Gastrointestinal: Positive for abdominal pain and diarrhea.         stool black and loose associated with low hemoglobin of 8   Endocrine: Negative. Genitourinary: Negative. Musculoskeletal: Positive for back pain. Rotator cuff injury awaiting surgery       Past Medical History:  Past Medical History:   Diagnosis Date    Arthritis     Fracture         Past Surgical History:  Past Surgical History:   Procedure Laterality Date     SECTION      CHOLECYSTECTOMY      HYSTERECTOMY         Past Family History:  Family History   Problem Relation Age of Onset    Heart Attack Mother     Diabetes Mother     Heart Disease Father     Heart Failure Father     Diabetes Father        Past Social History:  Social History     Social History    Marital status:       Spouse name: N/A   Aydin Gonzalez effusion. The thoracic aorta appears normal in caliber. There is no evidence of aortic dissection. The main pulmonary artery appears normal in caliber. The pulmonary arteries are well opacified, and there are no filling defects to suggest PE. The lungs appear clear. Review of the visualized portion of the upper abdomen demonstrates no acute abnormalities. Review of the visualized osseous structures demonstrates no acute or aggressive lesions. Degenerative changes are noted in the spine. Impression: 1. No evidence of PE or acute aortic pathology. 2. No acute findings in the chest. Signed by Dr Yasir Thomas on 10/25/2018 12:59 PM          Assessment and Plan:  Patient has had a long history of epigastric pain associated with intermittent bouts of melena. On this admission had had 3 day history of melanotic stool associated with epigastric pain hemoglobin 8  range.   This represents most likely an upper GI source of bleeding planning for EGD today keeping  patient n.p.o.      Ministerio Meraz MD 10/26/2018 8:24 AM  [facility]

## 2018-10-26 NOTE — PROGRESS NOTES
Was reported to me by charge nurse that telemetry tech called to report patient had a 12 second run of afib in 170's, checked patient and she has no complaints, will notify Dr Electronically signed by Crystal Jordan RN on 10/25/2018 at 8:22 PM

## 2018-10-27 LAB
ANION GAP SERPL CALCULATED.3IONS-SCNC: 7 MMOL/L (ref 7–19)
BLOOD BANK DISPENSE STATUS: NORMAL
BLOOD BANK PRODUCT CODE: NORMAL
BPU ID: NORMAL
BUN BLDV-MCNC: 9 MG/DL (ref 6–20)
CALCIUM SERPL-MCNC: 8.6 MG/DL (ref 8.6–10)
CHLORIDE BLD-SCNC: 105 MMOL/L (ref 98–111)
CO2: 29 MMOL/L (ref 22–29)
CREAT SERPL-MCNC: 0.5 MG/DL (ref 0.5–0.9)
DESCRIPTION BLOOD BANK: NORMAL
GFR NON-AFRICAN AMERICAN: >60
GLUCOSE BLD-MCNC: 110 MG/DL (ref 74–109)
HCT VFR BLD CALC: 22.8 % (ref 37–47)
HCT VFR BLD CALC: 22.9 % (ref 37–47)
HCT VFR BLD CALC: 25.6 % (ref 37–47)
HEMOGLOBIN: 7 G/DL (ref 12–16)
HEMOGLOBIN: 7.1 G/DL (ref 12–16)
HEMOGLOBIN: 8.1 G/DL (ref 12–16)
MCH RBC QN AUTO: 29.7 PG (ref 27–31)
MCH RBC QN AUTO: 30 PG (ref 27–31)
MCH RBC QN AUTO: 30.5 PG (ref 27–31)
MCHC RBC AUTO-ENTMCNC: 30.7 G/DL (ref 33–37)
MCHC RBC AUTO-ENTMCNC: 31 G/DL (ref 33–37)
MCHC RBC AUTO-ENTMCNC: 31.6 G/DL (ref 33–37)
MCV RBC AUTO: 96.2 FL (ref 81–99)
MCV RBC AUTO: 96.6 FL (ref 81–99)
MCV RBC AUTO: 96.6 FL (ref 81–99)
PDW BLD-RTO: 14.6 % (ref 11.5–14.5)
PDW BLD-RTO: 14.6 % (ref 11.5–14.5)
PDW BLD-RTO: 14.8 % (ref 11.5–14.5)
PLATELET # BLD: 281 K/UL (ref 130–400)
PLATELET # BLD: 285 K/UL (ref 130–400)
PLATELET # BLD: 291 K/UL (ref 130–400)
PMV BLD AUTO: 8.9 FL (ref 9.4–12.3)
PMV BLD AUTO: 9 FL (ref 9.4–12.3)
PMV BLD AUTO: 9.2 FL (ref 9.4–12.3)
POTASSIUM SERPL-SCNC: 4.4 MMOL/L (ref 3.5–5)
RBC # BLD: 2.36 M/UL (ref 4.2–5.4)
RBC # BLD: 2.37 M/UL (ref 4.2–5.4)
RBC # BLD: 2.66 M/UL (ref 4.2–5.4)
SODIUM BLD-SCNC: 141 MMOL/L (ref 136–145)
URINE CULTURE, ROUTINE: NORMAL
WBC # BLD: 7.7 K/UL (ref 4.8–10.8)
WBC # BLD: 8.1 K/UL (ref 4.8–10.8)
WBC # BLD: 9.6 K/UL (ref 4.8–10.8)

## 2018-10-27 PROCEDURE — C9113 INJ PANTOPRAZOLE SODIUM, VIA: HCPCS | Performed by: INTERNAL MEDICINE

## 2018-10-27 PROCEDURE — 6370000000 HC RX 637 (ALT 250 FOR IP): Performed by: CLINICAL NURSE SPECIALIST

## 2018-10-27 PROCEDURE — 36430 TRANSFUSION BLD/BLD COMPNT: CPT

## 2018-10-27 PROCEDURE — 36415 COLL VENOUS BLD VENIPUNCTURE: CPT

## 2018-10-27 PROCEDURE — 6370000000 HC RX 637 (ALT 250 FOR IP): Performed by: INTERNAL MEDICINE

## 2018-10-27 PROCEDURE — 99233 SBSQ HOSP IP/OBS HIGH 50: CPT | Performed by: INTERNAL MEDICINE

## 2018-10-27 PROCEDURE — 1210000000 HC MED SURG R&B

## 2018-10-27 PROCEDURE — 80048 BASIC METABOLIC PNL TOTAL CA: CPT

## 2018-10-27 PROCEDURE — 2580000003 HC RX 258: Performed by: INTERNAL MEDICINE

## 2018-10-27 PROCEDURE — 2580000003 HC RX 258: Performed by: CLINICAL NURSE SPECIALIST

## 2018-10-27 PROCEDURE — P9016 RBC LEUKOCYTES REDUCED: HCPCS

## 2018-10-27 PROCEDURE — 6360000002 HC RX W HCPCS: Performed by: INTERNAL MEDICINE

## 2018-10-27 PROCEDURE — 85027 COMPLETE CBC AUTOMATED: CPT

## 2018-10-27 RX ORDER — 0.9 % SODIUM CHLORIDE 0.9 %
250 INTRAVENOUS SOLUTION INTRAVENOUS ONCE
Status: COMPLETED | OUTPATIENT
Start: 2018-10-27 | End: 2018-10-27

## 2018-10-27 RX ORDER — OXYCODONE AND ACETAMINOPHEN 7.5; 325 MG/1; MG/1
1 TABLET ORAL EVERY 4 HOURS PRN
Status: DISCONTINUED | OUTPATIENT
Start: 2018-10-27 | End: 2018-10-28 | Stop reason: HOSPADM

## 2018-10-27 RX ADMIN — GABAPENTIN 600 MG: 600 TABLET, FILM COATED ORAL at 08:52

## 2018-10-27 RX ADMIN — GABAPENTIN 600 MG: 600 TABLET, FILM COATED ORAL at 12:45

## 2018-10-27 RX ADMIN — Medication 1 MG: at 23:06

## 2018-10-27 RX ADMIN — PANTOPRAZOLE SODIUM 40 MG: 40 INJECTION, POWDER, FOR SOLUTION INTRAVENOUS at 21:22

## 2018-10-27 RX ADMIN — SODIUM CHLORIDE: 9 INJECTION, SOLUTION INTRAVENOUS at 15:13

## 2018-10-27 RX ADMIN — SODIUM CHLORIDE 250 ML: 9 INJECTION, SOLUTION INTRAVENOUS at 10:10

## 2018-10-27 RX ADMIN — OXYCODONE HYDROCHLORIDE AND ACETAMINOPHEN 1 TABLET: 7.5; 325 TABLET ORAL at 09:07

## 2018-10-27 RX ADMIN — SUCRALFATE 1 G: 1 TABLET ORAL at 05:39

## 2018-10-27 RX ADMIN — Medication 1 MG: at 00:31

## 2018-10-27 RX ADMIN — SUCRALFATE 1 G: 1 TABLET ORAL at 12:05

## 2018-10-27 RX ADMIN — GABAPENTIN 600 MG: 600 TABLET, FILM COATED ORAL at 21:21

## 2018-10-27 RX ADMIN — Medication 10 ML: at 08:53

## 2018-10-27 RX ADMIN — OXYCODONE HYDROCHLORIDE AND ACETAMINOPHEN 1 TABLET: 7.5; 325 TABLET ORAL at 04:21

## 2018-10-27 RX ADMIN — SUCRALFATE 1 G: 1 TABLET ORAL at 17:58

## 2018-10-27 RX ADMIN — Medication 1 MG: at 05:39

## 2018-10-27 RX ADMIN — Medication 10 ML: at 21:22

## 2018-10-27 RX ADMIN — GABAPENTIN 600 MG: 600 TABLET, FILM COATED ORAL at 17:58

## 2018-10-27 RX ADMIN — SUCRALFATE 1 G: 1 TABLET ORAL at 22:14

## 2018-10-27 RX ADMIN — OXYCODONE HYDROCHLORIDE AND ACETAMINOPHEN 1 TABLET: 7.5; 325 TABLET ORAL at 12:58

## 2018-10-27 RX ADMIN — PANTOPRAZOLE SODIUM 40 MG: 40 INJECTION, POWDER, FOR SOLUTION INTRAVENOUS at 08:52

## 2018-10-27 RX ADMIN — OXYCODONE HYDROCHLORIDE AND ACETAMINOPHEN 1 TABLET: 7.5; 325 TABLET ORAL at 22:14

## 2018-10-27 RX ADMIN — Medication 1 MG: at 15:13

## 2018-10-27 RX ADMIN — OXYCODONE HYDROCHLORIDE AND ACETAMINOPHEN 1 TABLET: 7.5; 325 TABLET ORAL at 18:00

## 2018-10-27 RX ADMIN — Medication 1 MG: at 10:05

## 2018-10-27 RX ADMIN — Medication 1 MG: at 19:15

## 2018-10-27 ASSESSMENT — PAIN SCALES - GENERAL
PAINLEVEL_OUTOF10: 8
PAINLEVEL_OUTOF10: 8
PAINLEVEL_OUTOF10: 7
PAINLEVEL_OUTOF10: 9
PAINLEVEL_OUTOF10: 8
PAINLEVEL_OUTOF10: 9
PAINLEVEL_OUTOF10: 7
PAINLEVEL_OUTOF10: 6
PAINLEVEL_OUTOF10: 9
PAINLEVEL_OUTOF10: 8
PAINLEVEL_OUTOF10: 8

## 2018-10-27 ASSESSMENT — PAIN DESCRIPTION - LOCATION
LOCATION: OTHER (COMMENT)
LOCATION: ABDOMEN
LOCATION: ABDOMEN

## 2018-10-27 ASSESSMENT — PAIN DESCRIPTION - PAIN TYPE
TYPE: ACUTE PAIN

## 2018-10-27 NOTE — PROGRESS NOTES
Progress Note  10/27/2018 1:05 PM  Subjective:   Admit Date: 10/25/2018  PCP: No primary care provider on file. CC: UGIB    Subjective: pt seen and examined, still with some dark stools overnight. Denies any chest pain or sob. No n/v. Tolerated po. Admits to chronic pain which is unchanged. HGB 7 this am, no other overnight events. ROS: 14 point review of systems is negative except as specifically addressed above.     DIET FULL LIQUID;  Dietary Nutrition Supplements: Standard High Calorie Oral Supplement    Intake/Output Summary (Last 24 hours) at 10/27/18 1305  Last data filed at 10/27/18 1249   Gross per 24 hour   Intake          3109.46 ml   Output             1000 ml   Net          2109.46 ml     Medications:   sodium chloride 75 mL/hr at 10/26/18 2332     Current Facility-Administered Medications   Medication Dose Route Frequency Provider Last Rate Last Dose    0.9 % sodium chloride bolus  250 mL Intravenous Once Cooper Grim, DO 20 mL/hr at 10/27/18 1010 250 mL at 10/27/18 1010    HYDROmorphone (DILAUDID) injection 1 mg  1 mg Intravenous Q4H PRN Lacy Pouch Juno, DO   1 mg at 10/27/18 1005    oxyCODONE-acetaminophen (PERCOCET) 7.5-325 MG per tablet 1 tablet  1 tablet Oral Q6H PRN Cooper Grim, DO   1 tablet at 10/27/18 1258    sucralfate (CARAFATE) tablet 1 g  1 g Oral 4 times per day Cooper Grim, DO   1 g at 10/27/18 1205    pantoprazole (PROTONIX) injection 40 mg  40 mg Intravenous BID Lacy Pouch Juno, DO   40 mg at 10/27/18 2190    nitroGLYCERIN (NITROSTAT) SL tablet 0.4 mg  0.4 mg Sublingual Q5 Min PRN Carnella Maged, APRN   0.4 mg at 10/25/18 3372    acetaminophen (TYLENOL) tablet 650 mg  650 mg Oral Once Carnella Maged, APRN        influenza quadrivalent split vaccine (FLUZONE;FLUARIX;FLULAVAL;AFLURIA) injection 0.5 mL  0.5 mL Intramuscular Once Lacy Pouch Juno, DO        sodium chloride flush 0.9 % injection 10 mL  10 mL Intravenous 2 times per day Verl Hence, APRN   10 mL at

## 2018-10-27 NOTE — PROGRESS NOTES
22.9*  22.8*   MCV  95.2  96.6  96.6   MCH  30.2  30.0  29.7   MCHC  31.7*  31.0*  30.7*   PLT  328  291  281     Recent Labs      10/25/18   0824  10/25/18   0903  10/26/18   0233  10/27/18   0651   NA  138   --   138  141   K  3.1*  3.1  3.8  4.4   ANIONGAP  17   --   11  7   CL  98   --   105  105   CO2  23   --   22  29   BUN  42*   --   20  9   CREATININE  0.7   --   0.6  0.5   GLUCOSE  193*   --   108  110*   CALCIUM  9.6   --   8.4*  8.6     No results for input(s): MG, PHOS in the last 72 hours. Recent Labs      10/25/18   0824   AST  20   ALT  21   BILITOT  0.5   ALKPHOS  90     HgBA1c:  No components found for: HGBA1C  FLP:  No results found for: TRIG, HDL, LDLCALC, LDLDIRECT, LABVLDL  TSH:  No results found for: TSH  Troponin T:   Recent Labs      10/25/18   0842  10/25/18   1114   TROPONINI  0.07  0.00     INR: No results for input(s): INR in the last 72 hours. No results for input(s): LIPASE in the last 72 hours. -----------------------------------------------------------------  RAD:       Physical Exam:     Vitals:    10/27/18 0315 10/27/18 0719 10/27/18 1009 10/27/18 1035   BP: 109/66 100/68 110/68 (!) 92/5   Pulse: 88 72 81 96   Resp: 16 16 16 14   Temp: 99.1 °F (37.3 °C) 99.2 °F (37.3 °C) 99.2 °F (37.3 °C) 98.9 °F (37.2 °C)   TempSrc: Temporal Temporal Temporal    SpO2: 96% 96% 98% 97%   Weight:       Height:         24HR INTAKE/OUTPUT:    Intake/Output Summary (Last 24 hours) at 10/27/18 1050  Last data filed at 10/27/18 0422   Gross per 24 hour   Intake          2784.46 ml   Output             1300 ml   Net          1484.46 ml       Physical Exam   Constitutional: She appears well-developed and well-nourished. HENT:   Head: Normocephalic and atraumatic. Eyes: Pupils are equal, round, and reactive to light. EOM are normal.   Neck: Normal range of motion. Neck supple. No tracheal deviation present. Cardiovascular: Normal rate and regular rhythm.     Pulmonary/Chest: Effort normal and breath sounds normal.   Abdominal: Soft. Bowel sounds are normal. There is no tenderness. There is no guarding. Lymphadenopathy:     She has no cervical adenopathy. Impression:      patient does have antral ulcer. She also has erosion opposite the ulcer in the antrum, duodenitis. She is no longer bleeding from her ulcer and her ulcer pain is improved; she is tolerating her meals well in no GI complaints. Plan:   If patient's hemoglobin remains stable;  her stool is brown, he may be discharged from the GI point of view on current GI meds.   she should also  be on an acute and chronic ulcer diet for 6 weeks as an outpatient , all gastric ulcers  should be reevaluated endoscopically within 3 months    dEson Malin MD  10/27/2018 10:50 AM

## 2018-10-27 NOTE — PLAN OF CARE
Problem:  Activity:  Goal: Risk for activity intolerance will decrease  Risk for activity intolerance will decrease   Outcome: Ongoing      Problem: Health Behavior:  Goal: Ability to state signs and symptoms to report to health care provider will improve  Ability to state signs and symptoms to report to health care provider will improve   Outcome: Ongoing

## 2018-10-28 VITALS
DIASTOLIC BLOOD PRESSURE: 82 MMHG | TEMPERATURE: 99.7 F | RESPIRATION RATE: 20 BRPM | WEIGHT: 140 LBS | OXYGEN SATURATION: 96 % | BODY MASS INDEX: 21.97 KG/M2 | SYSTOLIC BLOOD PRESSURE: 148 MMHG | HEIGHT: 67 IN | HEART RATE: 92 BPM

## 2018-10-28 LAB
HCT VFR BLD CALC: 25.2 % (ref 37–47)
HCT VFR BLD CALC: 26.1 % (ref 37–47)
HEMOGLOBIN: 8.1 G/DL (ref 12–16)
HEMOGLOBIN: 8.1 G/DL (ref 12–16)
MCH RBC QN AUTO: 30 PG (ref 27–31)
MCH RBC QN AUTO: 30.8 PG (ref 27–31)
MCHC RBC AUTO-ENTMCNC: 31 G/DL (ref 33–37)
MCHC RBC AUTO-ENTMCNC: 32.1 G/DL (ref 33–37)
MCV RBC AUTO: 95.8 FL (ref 81–99)
MCV RBC AUTO: 96.7 FL (ref 81–99)
PDW BLD-RTO: 15.3 % (ref 11.5–14.5)
PDW BLD-RTO: 15.5 % (ref 11.5–14.5)
PLATELET # BLD: 289 K/UL (ref 130–400)
PLATELET # BLD: 292 K/UL (ref 130–400)
PMV BLD AUTO: 9.2 FL (ref 9.4–12.3)
PMV BLD AUTO: 9.3 FL (ref 9.4–12.3)
RBC # BLD: 2.63 M/UL (ref 4.2–5.4)
RBC # BLD: 2.7 M/UL (ref 4.2–5.4)
WBC # BLD: 7.5 K/UL (ref 4.8–10.8)
WBC # BLD: 8.7 K/UL (ref 4.8–10.8)

## 2018-10-28 PROCEDURE — 99239 HOSP IP/OBS DSCHRG MGMT >30: CPT | Performed by: INTERNAL MEDICINE

## 2018-10-28 PROCEDURE — C9113 INJ PANTOPRAZOLE SODIUM, VIA: HCPCS | Performed by: INTERNAL MEDICINE

## 2018-10-28 PROCEDURE — 6370000000 HC RX 637 (ALT 250 FOR IP): Performed by: INTERNAL MEDICINE

## 2018-10-28 PROCEDURE — 99223 1ST HOSP IP/OBS HIGH 75: CPT | Performed by: CLINICAL NURSE SPECIALIST

## 2018-10-28 PROCEDURE — 36415 COLL VENOUS BLD VENIPUNCTURE: CPT

## 2018-10-28 PROCEDURE — 99222 1ST HOSP IP/OBS MODERATE 55: CPT | Performed by: CLINICAL NURSE SPECIALIST

## 2018-10-28 PROCEDURE — 6360000002 HC RX W HCPCS: Performed by: INTERNAL MEDICINE

## 2018-10-28 PROCEDURE — 6370000000 HC RX 637 (ALT 250 FOR IP): Performed by: CLINICAL NURSE SPECIALIST

## 2018-10-28 PROCEDURE — 90686 IIV4 VACC NO PRSV 0.5 ML IM: CPT | Performed by: INTERNAL MEDICINE

## 2018-10-28 PROCEDURE — G0008 ADMIN INFLUENZA VIRUS VAC: HCPCS | Performed by: INTERNAL MEDICINE

## 2018-10-28 PROCEDURE — 85027 COMPLETE CBC AUTOMATED: CPT

## 2018-10-28 RX ORDER — PANTOPRAZOLE SODIUM 40 MG/1
40 TABLET, DELAYED RELEASE ORAL 2 TIMES DAILY
Qty: 60 TABLET | Refills: 0 | Status: SHIPPED | OUTPATIENT
Start: 2018-10-28 | End: 2019-08-28

## 2018-10-28 RX ORDER — SUCRALFATE 1 G/1
1 TABLET ORAL 4 TIMES DAILY
Qty: 120 TABLET | Refills: 0 | Status: SHIPPED | OUTPATIENT
Start: 2018-10-28

## 2018-10-28 RX ADMIN — OXYCODONE HYDROCHLORIDE AND ACETAMINOPHEN 1 TABLET: 7.5; 325 TABLET ORAL at 02:07

## 2018-10-28 RX ADMIN — OXYCODONE HYDROCHLORIDE AND ACETAMINOPHEN 1 TABLET: 7.5; 325 TABLET ORAL at 10:10

## 2018-10-28 RX ADMIN — GABAPENTIN 600 MG: 600 TABLET, FILM COATED ORAL at 12:35

## 2018-10-28 RX ADMIN — Medication 1 MG: at 03:45

## 2018-10-28 RX ADMIN — INFLUENZA A VIRUS A/MICHIGAN/45/2015 X-275 (H1N1) ANTIGEN (FORMALDEHYDE INACTIVATED), INFLUENZA A VIRUS A/SINGAPORE/INFIMH-16-0019/2016 IVR-186 (H3N2) ANTIGEN (FORMALDEHYDE INACTIVATED), INFLUENZA B VIRUS B/PHUKET/3073/2013 ANTIGEN (FORMALDEHYDE INACTIVATED), AND INFLUENZA B VIRUS B/MARYLAND/15/2016 BX-69A ANTIGEN (FORMALDEHYDE INACTIVATED) 0.5 ML: 15; 15; 15; 15 INJECTION, SUSPENSION INTRAMUSCULAR at 13:43

## 2018-10-28 RX ADMIN — PANTOPRAZOLE SODIUM 40 MG: 40 INJECTION, POWDER, FOR SOLUTION INTRAVENOUS at 09:48

## 2018-10-28 RX ADMIN — Medication 1 MG: at 07:47

## 2018-10-28 RX ADMIN — OXYCODONE HYDROCHLORIDE AND ACETAMINOPHEN 1 TABLET: 7.5; 325 TABLET ORAL at 06:05

## 2018-10-28 RX ADMIN — SUCRALFATE 1 G: 1 TABLET ORAL at 06:05

## 2018-10-28 RX ADMIN — SUCRALFATE 1 G: 1 TABLET ORAL at 12:35

## 2018-10-28 RX ADMIN — GABAPENTIN 600 MG: 600 TABLET, FILM COATED ORAL at 09:48

## 2018-10-28 ASSESSMENT — PAIN SCALES - GENERAL
PAINLEVEL_OUTOF10: 8
PAINLEVEL_OUTOF10: 6
PAINLEVEL_OUTOF10: 8

## 2018-10-29 ENCOUNTER — LAB REQUISITION (OUTPATIENT)
Dept: LAB | Facility: HOSPITAL | Age: 58
End: 2018-10-29

## 2018-10-29 DIAGNOSIS — Z00.00 ROUTINE GENERAL MEDICAL EXAMINATION AT A HEALTH CARE FACILITY: ICD-10-CM

## 2018-10-29 PROCEDURE — 88342 IMHCHEM/IMCYTCHM 1ST ANTB: CPT

## 2018-10-30 LAB
BLOOD CULTURE, ROUTINE: NORMAL
CULTURE, BLOOD 2: NORMAL
LAB AP CASE REPORT: NORMAL
PATH REPORT.FINAL DX SPEC: NORMAL

## 2018-11-13 ENCOUNTER — TELEPHONE (OUTPATIENT)
Dept: GASTROENTEROLOGY | Age: 58
End: 2018-11-13

## 2018-12-31 ENCOUNTER — TELEPHONE (OUTPATIENT)
Dept: GASTROENTEROLOGY | Age: 58
End: 2018-12-31

## 2019-05-29 PROBLEM — M75.121 COMPLETE TEAR OF RIGHT ROTATOR CUFF: Status: ACTIVE | Noted: 2019-05-29

## 2019-05-30 ENCOUNTER — ANESTHESIA EVENT (OUTPATIENT)
Dept: OPERATING ROOM | Age: 59
End: 2019-05-30
Payer: MEDICAID

## 2019-05-30 ENCOUNTER — HOSPITAL ENCOUNTER (OUTPATIENT)
Age: 59
Setting detail: OUTPATIENT SURGERY
Discharge: HOME | End: 2019-05-30
Attending: ORTHOPAEDIC SURGERY | Admitting: ORTHOPAEDIC SURGERY
Payer: MEDICAID

## 2019-05-30 ENCOUNTER — ANESTHESIA (OUTPATIENT)
Dept: OPERATING ROOM | Age: 59
End: 2019-05-30
Payer: MEDICAID

## 2019-05-30 VITALS
HEART RATE: 77 BPM | DIASTOLIC BLOOD PRESSURE: 80 MMHG | WEIGHT: 140 LBS | RESPIRATION RATE: 17 BRPM | TEMPERATURE: 98.1 F | BODY MASS INDEX: 21.97 KG/M2 | SYSTOLIC BLOOD PRESSURE: 135 MMHG | OXYGEN SATURATION: 98 % | HEIGHT: 67 IN

## 2019-05-30 VITALS
SYSTOLIC BLOOD PRESSURE: 119 MMHG | OXYGEN SATURATION: 100 % | TEMPERATURE: 96.9 F | DIASTOLIC BLOOD PRESSURE: 70 MMHG | RESPIRATION RATE: 1 BRPM

## 2019-05-30 DIAGNOSIS — M75.121 COMPLETE TEAR OF RIGHT ROTATOR CUFF: Primary | ICD-10-CM

## 2019-05-30 LAB
ANION GAP SERPL CALCULATED.3IONS-SCNC: 13 MMOL/L (ref 7–19)
BUN BLDV-MCNC: 7 MG/DL (ref 6–20)
CALCIUM SERPL-MCNC: 9 MG/DL (ref 8.6–10)
CHLORIDE BLD-SCNC: 105 MMOL/L (ref 98–111)
CO2: 23 MMOL/L (ref 22–29)
CREAT SERPL-MCNC: 0.5 MG/DL (ref 0.5–0.9)
EKG P AXIS: 21 DEGREES
EKG P-R INTERVAL: 146 MS
EKG Q-T INTERVAL: 416 MS
EKG QRS DURATION: 108 MS
EKG QTC CALCULATION (BAZETT): 442 MS
EKG T AXIS: 23 DEGREES
GFR NON-AFRICAN AMERICAN: >60
GLUCOSE BLD-MCNC: 97 MG/DL (ref 74–109)
HCT VFR BLD CALC: 34.6 % (ref 37–47)
HEMOGLOBIN: 11.3 G/DL (ref 12–16)
MCH RBC QN AUTO: 29.9 PG (ref 27–31)
MCHC RBC AUTO-ENTMCNC: 32.7 G/DL (ref 33–37)
MCV RBC AUTO: 91.5 FL (ref 81–99)
PDW BLD-RTO: 13.7 % (ref 11.5–14.5)
PLATELET # BLD: 468 K/UL (ref 130–400)
PMV BLD AUTO: 9 FL (ref 9.4–12.3)
POTASSIUM SERPL-SCNC: 3.7 MMOL/L (ref 3.5–4.9)
RBC # BLD: 3.78 M/UL (ref 4.2–5.4)
SODIUM BLD-SCNC: 141 MMOL/L (ref 136–145)
WBC # BLD: 8.1 K/UL (ref 4.8–10.8)

## 2019-05-30 PROCEDURE — 6370000000 HC RX 637 (ALT 250 FOR IP): Performed by: ANESTHESIOLOGY

## 2019-05-30 PROCEDURE — 6360000002 HC RX W HCPCS

## 2019-05-30 PROCEDURE — 36415 COLL VENOUS BLD VENIPUNCTURE: CPT

## 2019-05-30 PROCEDURE — 7100000000 HC PACU RECOVERY - FIRST 15 MIN: Performed by: ORTHOPAEDIC SURGERY

## 2019-05-30 PROCEDURE — 80048 BASIC METABOLIC PNL TOTAL CA: CPT

## 2019-05-30 PROCEDURE — C1713 ANCHOR/SCREW BN/BN,TIS/BN: HCPCS | Performed by: ORTHOPAEDIC SURGERY

## 2019-05-30 PROCEDURE — 93005 ELECTROCARDIOGRAM TRACING: CPT

## 2019-05-30 PROCEDURE — 3700000001 HC ADD 15 MINUTES (ANESTHESIA): Performed by: ORTHOPAEDIC SURGERY

## 2019-05-30 PROCEDURE — 6360000002 HC RX W HCPCS: Performed by: ORTHOPAEDIC SURGERY

## 2019-05-30 PROCEDURE — 7100000011 HC PHASE II RECOVERY - ADDTL 15 MIN: Performed by: ORTHOPAEDIC SURGERY

## 2019-05-30 PROCEDURE — 2580000003 HC RX 258: Performed by: ORTHOPAEDIC SURGERY

## 2019-05-30 PROCEDURE — 2500000003 HC RX 250 WO HCPCS

## 2019-05-30 PROCEDURE — 3600000014 HC SURGERY LEVEL 4 ADDTL 15MIN: Performed by: ORTHOPAEDIC SURGERY

## 2019-05-30 PROCEDURE — 2720000010 HC SURG SUPPLY STERILE: Performed by: ORTHOPAEDIC SURGERY

## 2019-05-30 PROCEDURE — 7100000010 HC PHASE II RECOVERY - FIRST 15 MIN: Performed by: ORTHOPAEDIC SURGERY

## 2019-05-30 PROCEDURE — 64415 NJX AA&/STRD BRCH PLXS IMG: CPT

## 2019-05-30 PROCEDURE — 3600000004 HC SURGERY LEVEL 4 BASE: Performed by: ORTHOPAEDIC SURGERY

## 2019-05-30 PROCEDURE — 6360000002 HC RX W HCPCS: Performed by: ANESTHESIOLOGY

## 2019-05-30 PROCEDURE — 85027 COMPLETE CBC AUTOMATED: CPT

## 2019-05-30 PROCEDURE — 2709999900 HC NON-CHARGEABLE SUPPLY: Performed by: ORTHOPAEDIC SURGERY

## 2019-05-30 PROCEDURE — 7100000001 HC PACU RECOVERY - ADDTL 15 MIN: Performed by: ORTHOPAEDIC SURGERY

## 2019-05-30 PROCEDURE — 2580000003 HC RX 258: Performed by: ANESTHESIOLOGY

## 2019-05-30 PROCEDURE — 3700000000 HC ANESTHESIA ATTENDED CARE: Performed by: ORTHOPAEDIC SURGERY

## 2019-05-30 DEVICE — ANCHOR SUTURE BIOCOMP 4.75X19.1 MM SWIVELOCK C: Type: IMPLANTABLE DEVICE | Site: SHOULDER | Status: FUNCTIONAL

## 2019-05-30 RX ORDER — MORPHINE SULFATE 2 MG/ML
2 INJECTION, SOLUTION INTRAMUSCULAR; INTRAVENOUS EVERY 5 MIN PRN
Status: DISCONTINUED | OUTPATIENT
Start: 2019-05-30 | End: 2019-05-30 | Stop reason: HOSPADM

## 2019-05-30 RX ORDER — LABETALOL 20 MG/4 ML (5 MG/ML) INTRAVENOUS SYRINGE
5 EVERY 10 MIN PRN
Status: DISCONTINUED | OUTPATIENT
Start: 2019-05-30 | End: 2019-05-30 | Stop reason: HOSPADM

## 2019-05-30 RX ORDER — ONDANSETRON 2 MG/ML
INJECTION INTRAMUSCULAR; INTRAVENOUS PRN
Status: DISCONTINUED | OUTPATIENT
Start: 2019-05-30 | End: 2019-05-30 | Stop reason: SDUPTHER

## 2019-05-30 RX ORDER — HYDRALAZINE HYDROCHLORIDE 20 MG/ML
5 INJECTION INTRAMUSCULAR; INTRAVENOUS EVERY 10 MIN PRN
Status: DISCONTINUED | OUTPATIENT
Start: 2019-05-30 | End: 2019-05-30 | Stop reason: HOSPADM

## 2019-05-30 RX ORDER — ONDANSETRON 4 MG/1
4 TABLET, FILM COATED ORAL EVERY 8 HOURS PRN
Qty: 10 TABLET | Refills: 0 | Status: SHIPPED | OUTPATIENT
Start: 2019-05-30

## 2019-05-30 RX ORDER — FENTANYL CITRATE 50 UG/ML
25 INJECTION, SOLUTION INTRAMUSCULAR; INTRAVENOUS
Status: DISCONTINUED | OUTPATIENT
Start: 2019-05-30 | End: 2019-05-30 | Stop reason: HOSPADM

## 2019-05-30 RX ORDER — OXYCODONE HYDROCHLORIDE AND ACETAMINOPHEN 5; 325 MG/1; MG/1
2 TABLET ORAL PRN
Status: DISCONTINUED | OUTPATIENT
Start: 2019-05-30 | End: 2019-05-30 | Stop reason: HOSPADM

## 2019-05-30 RX ORDER — MORPHINE SULFATE 2 MG/ML
4 INJECTION, SOLUTION INTRAMUSCULAR; INTRAVENOUS EVERY 5 MIN PRN
Status: DISCONTINUED | OUTPATIENT
Start: 2019-05-30 | End: 2019-05-30 | Stop reason: HOSPADM

## 2019-05-30 RX ORDER — OXYCODONE AND ACETAMINOPHEN 10; 325 MG/1; MG/1
1-2 TABLET ORAL
Qty: 30 TABLET | Refills: 0 | Status: SHIPPED | OUTPATIENT
Start: 2019-05-30 | End: 2019-06-02

## 2019-05-30 RX ORDER — LIDOCAINE HYDROCHLORIDE 10 MG/ML
INJECTION, SOLUTION EPIDURAL; INFILTRATION; INTRACAUDAL; PERINEURAL PRN
Status: DISCONTINUED | OUTPATIENT
Start: 2019-05-30 | End: 2019-05-30 | Stop reason: SDUPTHER

## 2019-05-30 RX ORDER — SODIUM CHLORIDE 0.9 % (FLUSH) 0.9 %
10 SYRINGE (ML) INJECTION EVERY 12 HOURS SCHEDULED
Status: DISCONTINUED | OUTPATIENT
Start: 2019-05-30 | End: 2019-05-30 | Stop reason: HOSPADM

## 2019-05-30 RX ORDER — ENALAPRILAT 2.5 MG/2ML
1.25 INJECTION INTRAVENOUS
Status: DISCONTINUED | OUTPATIENT
Start: 2019-05-30 | End: 2019-05-30 | Stop reason: HOSPADM

## 2019-05-30 RX ORDER — METOCLOPRAMIDE HYDROCHLORIDE 5 MG/ML
10 INJECTION INTRAMUSCULAR; INTRAVENOUS
Status: DISCONTINUED | OUTPATIENT
Start: 2019-05-30 | End: 2019-05-30 | Stop reason: HOSPADM

## 2019-05-30 RX ORDER — MIDAZOLAM HYDROCHLORIDE 1 MG/ML
2 INJECTION INTRAMUSCULAR; INTRAVENOUS
Status: COMPLETED | OUTPATIENT
Start: 2019-05-30 | End: 2019-05-30

## 2019-05-30 RX ORDER — LIDOCAINE HYDROCHLORIDE 10 MG/ML
1 INJECTION, SOLUTION EPIDURAL; INFILTRATION; INTRACAUDAL; PERINEURAL
Status: DISCONTINUED | OUTPATIENT
Start: 2019-05-30 | End: 2019-05-30 | Stop reason: HOSPADM

## 2019-05-30 RX ORDER — FENTANYL CITRATE 50 UG/ML
50 INJECTION, SOLUTION INTRAMUSCULAR; INTRAVENOUS
Status: DISCONTINUED | OUTPATIENT
Start: 2019-05-30 | End: 2019-05-30 | Stop reason: HOSPADM

## 2019-05-30 RX ORDER — SODIUM CHLORIDE 0.9 % (FLUSH) 0.9 %
10 SYRINGE (ML) INJECTION PRN
Status: DISCONTINUED | OUTPATIENT
Start: 2019-05-30 | End: 2019-05-30 | Stop reason: HOSPADM

## 2019-05-30 RX ORDER — PROPOFOL 10 MG/ML
INJECTION, EMULSION INTRAVENOUS PRN
Status: DISCONTINUED | OUTPATIENT
Start: 2019-05-30 | End: 2019-05-30 | Stop reason: SDUPTHER

## 2019-05-30 RX ORDER — DEXAMETHASONE SODIUM PHOSPHATE 10 MG/ML
INJECTION INTRAMUSCULAR; INTRAVENOUS PRN
Status: DISCONTINUED | OUTPATIENT
Start: 2019-05-30 | End: 2019-05-30 | Stop reason: SDUPTHER

## 2019-05-30 RX ORDER — SODIUM CHLORIDE, SODIUM LACTATE, POTASSIUM CHLORIDE, CALCIUM CHLORIDE 600; 310; 30; 20 MG/100ML; MG/100ML; MG/100ML; MG/100ML
INJECTION, SOLUTION INTRAVENOUS CONTINUOUS
Status: DISCONTINUED | OUTPATIENT
Start: 2019-05-30 | End: 2019-05-30 | Stop reason: HOSPADM

## 2019-05-30 RX ORDER — FENTANYL CITRATE 50 UG/ML
INJECTION, SOLUTION INTRAMUSCULAR; INTRAVENOUS PRN
Status: DISCONTINUED | OUTPATIENT
Start: 2019-05-30 | End: 2019-05-30 | Stop reason: SDUPTHER

## 2019-05-30 RX ORDER — OXYCODONE HYDROCHLORIDE AND ACETAMINOPHEN 5; 325 MG/1; MG/1
1 TABLET ORAL PRN
Status: DISCONTINUED | OUTPATIENT
Start: 2019-05-30 | End: 2019-05-30 | Stop reason: HOSPADM

## 2019-05-30 RX ORDER — MEPERIDINE HYDROCHLORIDE 50 MG/ML
12.5 INJECTION INTRAMUSCULAR; INTRAVENOUS; SUBCUTANEOUS EVERY 5 MIN PRN
Status: DISCONTINUED | OUTPATIENT
Start: 2019-05-30 | End: 2019-05-30 | Stop reason: HOSPADM

## 2019-05-30 RX ORDER — SODIUM CHLORIDE 9 MG/ML
INJECTION, SOLUTION INTRAVENOUS CONTINUOUS
Status: DISCONTINUED | OUTPATIENT
Start: 2019-05-30 | End: 2019-05-30 | Stop reason: HOSPADM

## 2019-05-30 RX ORDER — ROCURONIUM BROMIDE 10 MG/ML
INJECTION, SOLUTION INTRAVENOUS PRN
Status: DISCONTINUED | OUTPATIENT
Start: 2019-05-30 | End: 2019-05-30 | Stop reason: SDUPTHER

## 2019-05-30 RX ORDER — SCOLOPAMINE TRANSDERMAL SYSTEM 1 MG/1
1 PATCH, EXTENDED RELEASE TRANSDERMAL
Status: DISCONTINUED | OUTPATIENT
Start: 2019-05-30 | End: 2019-05-30 | Stop reason: HOSPADM

## 2019-05-30 RX ADMIN — MIDAZOLAM 2 MG: 1 INJECTION INTRAMUSCULAR; INTRAVENOUS at 15:09

## 2019-05-30 RX ADMIN — SODIUM CHLORIDE, SODIUM LACTATE, POTASSIUM CHLORIDE, AND CALCIUM CHLORIDE: 600; 310; 30; 20 INJECTION, SOLUTION INTRAVENOUS at 16:30

## 2019-05-30 RX ADMIN — FENTANYL CITRATE 100 MCG: 50 INJECTION INTRAMUSCULAR; INTRAVENOUS at 15:39

## 2019-05-30 RX ADMIN — ONDANSETRON HYDROCHLORIDE 4 MG: 2 INJECTION, SOLUTION INTRAMUSCULAR; INTRAVENOUS at 16:19

## 2019-05-30 RX ADMIN — MEPERIDINE HYDROCHLORIDE 12.5 MG: 50 INJECTION INTRAMUSCULAR; INTRAVENOUS; SUBCUTANEOUS at 18:00

## 2019-05-30 RX ADMIN — HYDROMORPHONE HYDROCHLORIDE 0.5 MG: 1 INJECTION, SOLUTION INTRAMUSCULAR; INTRAVENOUS; SUBCUTANEOUS at 16:52

## 2019-05-30 RX ADMIN — ROCURONIUM BROMIDE 50 MG: 10 INJECTION INTRAVENOUS at 15:39

## 2019-05-30 RX ADMIN — SUGAMMADEX 150 MG: 100 INJECTION, SOLUTION INTRAVENOUS at 16:23

## 2019-05-30 RX ADMIN — WATER 1 G: 1 INJECTION INTRAMUSCULAR; INTRAVENOUS; SUBCUTANEOUS at 15:54

## 2019-05-30 RX ADMIN — SODIUM CHLORIDE, SODIUM LACTATE, POTASSIUM CHLORIDE, AND CALCIUM CHLORIDE: 600; 310; 30; 20 INJECTION, SOLUTION INTRAVENOUS at 15:34

## 2019-05-30 RX ADMIN — PROPOFOL 140 MG: 10 INJECTION, EMULSION INTRAVENOUS at 15:39

## 2019-05-30 RX ADMIN — LIDOCAINE HYDROCHLORIDE 50 MG: 10 INJECTION, SOLUTION EPIDURAL; INFILTRATION; INTRACAUDAL; PERINEURAL at 15:39

## 2019-05-30 RX ADMIN — DEXAMETHASONE SODIUM PHOSPHATE 10 MG: 10 INJECTION INTRAMUSCULAR; INTRAVENOUS at 15:55

## 2019-05-30 ASSESSMENT — PAIN SCALES - GENERAL
PAINLEVEL_OUTOF10: 9
PAINLEVEL_OUTOF10: 10

## 2019-05-30 NOTE — ANESTHESIA PRE PROCEDURE
Department of Anesthesiology  Preprocedure Note       Name:  Carolyn Sweet   Age:  60 y.o.  :  1960                                          MRN:  [id number]         Date:  2019      Surgeon: Dannielle Santillan):  Brandon House MD    Procedure: RIGHT SHOULDER ARTHROSCOPIC ROTATOR CUFF REPAIR, SUBACROMIAL DECOMPRESSION (Right )    Medications prior to admission:   Prior to Admission medications    Medication Sig Start Date End Date Taking? Authorizing Provider   gabapentin (NEURONTIN) 600 MG tablet Take 300 mg by mouth 2 times daily. Yes Historical Provider, MD   sucralfate (CARAFATE) 1 GM tablet Take 1 tablet by mouth 4 times daily 10/28/18   Phoebe Fraire DO   pantoprazole (PROTONIX) 40 MG tablet Take 1 tablet by mouth 2 times daily 10/28/18   Mallika Swift DO       Current medications:    No current facility-administered medications for this encounter. Allergies: Allergies   Allergen Reactions    Benadryl [Diphenhydramine]      States it gives her restless legs    Phenergan [Promethazine Hcl]        Problem List:    Patient Active Problem List   Diagnosis Code    Acute lower GI bleeding K92.2    Chest pressure R07.89    Hypokalemia E87.6    Dehydration, mild E86.0    Fever R50.9    Acute blood loss anemia D62    Chronic low back pain M54.5, G89.29    Acute respiratory alkalosis E87.3    Upper GI bleed K92.2    Complete tear of right rotator cuff M75.121       Past Medical History:        Diagnosis Date    Arthritis     Fracture        Past Surgical History:        Procedure Laterality Date     SECTION      CHOLECYSTECTOMY      HYSTERECTOMY      DC EGD TRANSORAL BIOPSY SINGLE/MULTIPLE N/A 10/26/2018    Dr Fransisca Kim ulcer-Active esophagitis, repeat in 2 month       Social History:    Social History     Tobacco Use    Smoking status: Never Smoker    Smokeless tobacco: Never Used   Substance Use Topics    Alcohol use:  Yes                                Counseling given: Not Answered      Vital Signs (Current):   Vitals:    05/30/19 1213   BP: (!) 145/86   Pulse: 93   Resp: 16   Temp: 99.1 °F (37.3 °C)   TempSrc: Temporal   SpO2: 97%                                              BP Readings from Last 3 Encounters:   05/30/19 (!) 145/86   10/28/18 (!) 148/82   10/26/18 121/60       NPO Status: Time of last liquid consumption: 2345                        Time of last solid consumption: 2345                        Date of last liquid consumption: 05/29/19                        Date of last solid food consumption: 05/29/19    BMI:   Wt Readings from Last 3 Encounters:   10/25/18 140 lb (63.5 kg)   01/24/18 140 lb (63.5 kg)   08/10/16 170 lb (77.1 kg)     There is no height or weight on file to calculate BMI.    CBC:   Lab Results   Component Value Date    WBC 7.5 10/28/2018    RBC 2.70 10/28/2018    HGB 8.1 10/28/2018    HCT 26.1 10/28/2018    HCT 38.5 09/27/2011    MCV 96.7 10/28/2018    RDW 15.3 10/28/2018     10/28/2018     09/27/2011       CMP:   Lab Results   Component Value Date     10/27/2018     09/27/2011    K 4.4 10/27/2018    K 3.8 10/26/2018    K 3.5 09/27/2011     10/27/2018     09/27/2011    CO2 29 10/27/2018    BUN 9 10/27/2018    CREATININE 0.5 10/27/2018    CREATININE 0.7 09/27/2011    LABGLOM >60 10/27/2018    GLUCOSE 110 10/27/2018    PROT 7.4 10/25/2018    CALCIUM 8.6 10/27/2018    BILITOT 0.5 10/25/2018    ALKPHOS 90 10/25/2018    AST 20 10/25/2018    ALT 21 10/25/2018       POC Tests: No results for input(s): POCGLU, POCNA, POCK, POCCL, POCBUN, POCHEMO, POCHCT in the last 72 hours.     Coags:   Lab Results   Component Value Date    PROTIME 12.7 08/14/2015    INR 0.98 08/14/2015       HCG (If Applicable): No results found for: PREGTESTUR, PREGSERUM, HCG, HCGQUANT     ABGs:   Lab Results   Component Value Date    PHART 7.500 10/25/2018    PO2ART 77.0 10/25/2018    TTC5TBM 31.0 10/25/2018    COL8TSW 24.2 10/25/2018    BEART 1.5 10/25/2018    C1SEMNQS 94.9 10/25/2018        Type & Screen (If Applicable):  No results found for: LABABO, [address]    Anesthesia Evaluation  Patient summary reviewed and Nursing notes reviewed no history of anesthetic complications:   Airway: Mallampati: II  TM distance: >3 FB   Neck ROM: full  Mouth opening: > = 3 FB Dental:    (+) upper dentures and lower dentures      Pulmonary:Negative Pulmonary ROS breath sounds clear to auscultation                             Cardiovascular:  Exercise tolerance: good (>4 METS),   (+) dysrhythmias (no treatement, medications, or anticoagulation per pt): atrial fibrillation,     (-) pacemaker, hypertension, valvular problems/murmurs, past MI, CAD and CABG/stent    ECG reviewed  Rhythm: regular  Rate: normal           Beta Blocker:  Not on Beta Blocker         Neuro/Psych:      (-) seizures and CVA           GI/Hepatic/Renal:   (+) PUD,      (-) liver disease and no renal disease      ROS comment: Hx of gi bleed requiring blood transfusion. Endo/Other:    (+) no malignancy/cancer. (-) diabetes mellitus, hypothyroidism, blood dyscrasia, no malignancy/cancer               Abdominal:           Vascular:     - DVT and PE. Anesthesia Plan      general and regional     ASA 3     (Please review labs and EKG prior to OR. Thanks. Interscalene block.)  Induction: intravenous. BIS  MIPS: Postoperative opioids intended and Prophylactic antiemetics administered. Anesthetic plan and risks discussed with patient. Use of blood products discussed with patient whom consented to blood products.                    Marlin Vickers DO   5/30/2019

## 2019-05-30 NOTE — OP NOTE
A) Chondral surfaces: Intact          B) Labrum: Intact without tear            C) Biceps: Intact without tear        D) Rotator Cuff:  Complete full thickness tear of supraspinatus, crescent shaped, tendon quality moderate, bone quality good, minimal excursion  3) Subacromial space:         A) Large amount of dense vascular bursa         B) Type 3 acromium, hypertrophic coracoacromial ligament         C) Bursal surface rotator cuff: Complete tear as above         D) AC joint:  Intact without arthritic change       PROCEDURE in DETAIL:  The patient was seen in the preoperative holding room, once again the informed consent was reviewed with the patient and signed. The site of surgery was marked with the patient's agreement. After being transported to the operating room, a timeout was performed identifying the correct patient as well as the operative site. Dose appropriate IV antibiotics were given prior to incision. The patient was positioned in the beach chair position, all bony prominences were protected and a sterile prep and drape was performed. A standard posterior arthroscopy portal was established and an outside-in technique was utilized to establish an anterior portal within the rotator interval.  An arthroscopic probe was inserted and a thorough exam of the glenohumeral joint was performed. Attention was then turned to the intraarticular portion of the rotator cuff. A probe was used to identify a complete tear of the rotator cuff and a shaver was used to debride the tendon. The arthroscope was then transitioned to the subacromial space and an outside in technique was used to establish a lateral portal.  The arthroscopic shaver was introduced in the subacromial space and a complete bursectomy was performed with this device. Attention was turned to the anterior and lateral edge of the acromium where soft tissue was removed with a cautery device.   Due to a prominent acromial spur causing impingement, an acromioplasty was performed with an arthroscopic alissa converting this to a flat type 1 morphology. The coracoaromial ligament was incised with a cautery device completing the subacromial decompression. Attention was then turned to the rotator cuff tear which was visualized from the intraarticular space. The tendon and greater tuberosity footprint were debrided followed by shuttling fibertape and fiberwire suture into the tear site in a horizontal mattress fashion with a suture passing device. Fixation was then performed to the greater tuberosity footprint with a 4.75 mm bioswivelock anchor in knotless fashion. The repair appeared to be anatomic. Portals were then closed with monocryl and a sterile dressing was applied followed by a sling. The patient was awakened from anesthesia, transported to the recovery room in stable condition.     POSTOP PLAN:    1) Discharge home with family  2) Follow up in 1 week for a clinical check  3) Follow the following protocol: Small/Medium RCR      Electronically signed by Melonie Amor MD on 5/30/2019 at 4:34 PM

## 2019-05-30 NOTE — PROGRESS NOTES
Pt placed on bedpan/ no luck. Pt states she \"dribbled\" before given the bedpan.  Blankets were changed

## 2019-05-30 NOTE — ANESTHESIA PROCEDURE NOTES
Peripheral Block    Patient location during procedure: pre-op  Start time: 5/30/2019 3:10 PM  End time: 5/30/2019 3:10 PM  Staffing  Anesthesiologist: Zeynep Melendez MD  Performed: anesthesiologist   Preanesthetic Checklist  Completed: patient identified, site marked, surgical consent, pre-op evaluation, timeout performed, IV checked, risks and benefits discussed, monitors and equipment checked, anesthesia consent given, oxygen available and patient being monitored  Peripheral Block  Patient position: supine  Prep: ChloraPrep  Patient monitoring: cardiac monitor, continuous pulse ox, frequent blood pressure checks and IV access  Block type: Brachial plexus  Laterality: right  Injection technique: single-shot  Procedures: ultrasound guided  Provider prep: mask and sterile gloves  Needle  Needle type: short-bevel   Needle gauge: 22 G  Needle length: 5 cm  Needle localization: anatomical landmarks and ultrasound guidance  Assessment  Injection assessment: negative aspiration for heme, no paresthesia on injection and local visualized surrounding nerve on ultrasound  Paresthesia pain: none  Slow fractionated injection: yes  Hemodynamics: stable  Additional Notes  Needle was introduced aprroximately the C5-C6 region and using a inplane imaging technique the needle was directed thru the middle scalene muscle and brought to bear adjacent to the brachial plexus. Needle advancement was under direct vision at all times . Local Anesthesia was injected and all vascular structures were avoided. The local anesthetic hydrodissected with half the dose deposited on lateral side of the plexus, and other half was deposited on the medial half of the brachial plexus. Total dose was 10cc of 1.3% Exparel and 10cc 0.5% Bupivicaine  The plexus appeared anatomically normal and no obvious pathology was noted.      Reason for block: post-op pain management

## 2019-05-30 NOTE — H&P
Pt Name: Sigifredo Browne  MRN: [id number]  YOB: 1960  Date of evaluation: 5/30/2019    H&P including current review of systems was updated in the paper chart and/or the document previously scanned into the record. There have been no significant changes or new problems since the original evaluation. The patient's problems continue and indications for contemplated procedure have not changed.     Electronically signed by Jono Murillo MD on 5/30/2019 at 11:38 AM

## 2019-08-28 ENCOUNTER — HOSPITAL ENCOUNTER (OUTPATIENT)
Age: 59
Setting detail: OBSERVATION
Discharge: HOME | End: 2019-08-28
Attending: HOSPITALIST | Admitting: FAMILY MEDICINE
Payer: MEDICAID

## 2019-08-28 ENCOUNTER — APPOINTMENT (OUTPATIENT)
Dept: GENERAL RADIOLOGY | Age: 59
End: 2019-08-28
Payer: MEDICAID

## 2019-08-28 ENCOUNTER — APPOINTMENT (OUTPATIENT)
Dept: NUCLEAR MEDICINE | Age: 59
End: 2019-08-28
Payer: MEDICAID

## 2019-08-28 VITALS
BODY MASS INDEX: 24.39 KG/M2 | DIASTOLIC BLOOD PRESSURE: 79 MMHG | WEIGHT: 155.4 LBS | HEIGHT: 67 IN | TEMPERATURE: 97.4 F | OXYGEN SATURATION: 97 % | RESPIRATION RATE: 18 BRPM | SYSTOLIC BLOOD PRESSURE: 131 MMHG | HEART RATE: 49 BPM

## 2019-08-28 DIAGNOSIS — R07.89 OTHER CHEST PAIN: Primary | ICD-10-CM

## 2019-08-28 PROBLEM — K21.9 GASTROESOPHAGEAL REFLUX DISEASE: Status: ACTIVE | Noted: 2019-08-28

## 2019-08-28 PROBLEM — Z82.49 FAMILY HISTORY OF EARLY CAD: Status: ACTIVE | Noted: 2019-08-28

## 2019-08-28 PROBLEM — M94.0 COSTOCHONDRITIS: Status: ACTIVE | Noted: 2019-08-28

## 2019-08-28 PROBLEM — R07.9 CHEST PAIN: Status: ACTIVE | Noted: 2019-08-28

## 2019-08-28 PROBLEM — R06.09 DOE (DYSPNEA ON EXERTION): Status: ACTIVE | Noted: 2019-08-28

## 2019-08-28 LAB
ALBUMIN SERPL-MCNC: 4.2 G/DL (ref 3.5–5.2)
ALP BLD-CCNC: 86 U/L (ref 35–104)
ALT SERPL-CCNC: 14 U/L (ref 5–33)
ANION GAP SERPL CALCULATED.3IONS-SCNC: 15 MMOL/L (ref 7–19)
APTT: 28.9 SEC (ref 26–36.2)
AST SERPL-CCNC: 17 U/L (ref 5–32)
BASOPHILS ABSOLUTE: 0.1 K/UL (ref 0–0.2)
BASOPHILS RELATIVE PERCENT: 1 % (ref 0–1)
BILIRUB SERPL-MCNC: <0.2 MG/DL (ref 0.2–1.2)
BUN BLDV-MCNC: 5 MG/DL (ref 6–20)
CALCIUM SERPL-MCNC: 9 MG/DL (ref 8.6–10)
CHLORIDE BLD-SCNC: 105 MMOL/L (ref 98–111)
CO2: 19 MMOL/L (ref 22–29)
CREAT SERPL-MCNC: 0.6 MG/DL (ref 0.5–0.9)
D DIMER: 0.29 UG/ML FEU (ref 0–0.48)
EKG P AXIS: -33 DEGREES
EKG P-R INTERVAL: 134 MS
EKG Q-T INTERVAL: 480 MS
EKG QRS DURATION: 104 MS
EKG QTC CALCULATION (BAZETT): 465 MS
EKG T AXIS: 51 DEGREES
EOSINOPHILS ABSOLUTE: 0.3 K/UL (ref 0–0.6)
EOSINOPHILS RELATIVE PERCENT: 2.7 % (ref 0–5)
GFR NON-AFRICAN AMERICAN: >60
GLUCOSE BLD-MCNC: 108 MG/DL (ref 74–109)
HCT VFR BLD CALC: 35.2 % (ref 37–47)
HEMOGLOBIN: 11.3 G/DL (ref 12–16)
IMMATURE GRANULOCYTES #: 0 K/UL
INR BLD: 1.08 (ref 0.88–1.18)
LYMPHOCYTES ABSOLUTE: 4.8 K/UL (ref 1.1–4.5)
LYMPHOCYTES RELATIVE PERCENT: 49.8 % (ref 20–40)
MCH RBC QN AUTO: 29.3 PG (ref 27–31)
MCHC RBC AUTO-ENTMCNC: 32.1 G/DL (ref 33–37)
MCV RBC AUTO: 91.2 FL (ref 81–99)
MONOCYTES ABSOLUTE: 0.6 K/UL (ref 0–0.9)
MONOCYTES RELATIVE PERCENT: 6.2 % (ref 0–10)
NEUTROPHILS ABSOLUTE: 3.9 K/UL (ref 1.5–7.5)
NEUTROPHILS RELATIVE PERCENT: 40.1 % (ref 50–65)
PDW BLD-RTO: 13.4 % (ref 11.5–14.5)
PLATELET # BLD: 391 K/UL (ref 130–400)
PMV BLD AUTO: 9.7 FL (ref 9.4–12.3)
POTASSIUM SERPL-SCNC: 3.8 MMOL/L (ref 3.5–5)
PRO-BNP: 604 PG/ML (ref 0–900)
PROTHROMBIN TIME: 13.4 SEC (ref 12–14.6)
RBC # BLD: 3.86 M/UL (ref 4.2–5.4)
SODIUM BLD-SCNC: 139 MMOL/L (ref 136–145)
TOTAL PROTEIN: 7.2 G/DL (ref 6.6–8.7)
TROPONIN: <0.01 NG/ML (ref 0–0.03)
WBC # BLD: 9.7 K/UL (ref 4.8–10.8)

## 2019-08-28 PROCEDURE — 78452 HT MUSCLE IMAGE SPECT MULT: CPT

## 2019-08-28 PROCEDURE — 93005 ELECTROCARDIOGRAM TRACING: CPT

## 2019-08-28 PROCEDURE — 2580000003 HC RX 258: Performed by: HOSPITALIST

## 2019-08-28 PROCEDURE — 36415 COLL VENOUS BLD VENIPUNCTURE: CPT

## 2019-08-28 PROCEDURE — 6370000000 HC RX 637 (ALT 250 FOR IP): Performed by: HOSPITALIST

## 2019-08-28 PROCEDURE — 80053 COMPREHEN METABOLIC PANEL: CPT

## 2019-08-28 PROCEDURE — 93017 CV STRESS TEST TRACING ONLY: CPT

## 2019-08-28 PROCEDURE — 85610 PROTHROMBIN TIME: CPT

## 2019-08-28 PROCEDURE — 99285 EMERGENCY DEPT VISIT HI MDM: CPT

## 2019-08-28 PROCEDURE — 85730 THROMBOPLASTIN TIME PARTIAL: CPT

## 2019-08-28 PROCEDURE — 85379 FIBRIN DEGRADATION QUANT: CPT

## 2019-08-28 PROCEDURE — 6360000002 HC RX W HCPCS: Performed by: NURSE PRACTITIONER

## 2019-08-28 PROCEDURE — 3430000000 HC RX DIAGNOSTIC RADIOPHARMACEUTICAL: Performed by: FAMILY MEDICINE

## 2019-08-28 PROCEDURE — 96374 THER/PROPH/DIAG INJ IV PUSH: CPT

## 2019-08-28 PROCEDURE — G0378 HOSPITAL OBSERVATION PER HR: HCPCS

## 2019-08-28 PROCEDURE — A9500 TC99M SESTAMIBI: HCPCS | Performed by: FAMILY MEDICINE

## 2019-08-28 PROCEDURE — 83880 ASSAY OF NATRIURETIC PEPTIDE: CPT

## 2019-08-28 PROCEDURE — 71045 X-RAY EXAM CHEST 1 VIEW: CPT

## 2019-08-28 PROCEDURE — 84484 ASSAY OF TROPONIN QUANT: CPT

## 2019-08-28 PROCEDURE — 85025 COMPLETE CBC W/AUTO DIFF WBC: CPT

## 2019-08-28 PROCEDURE — 96375 TX/PRO/DX INJ NEW DRUG ADDON: CPT

## 2019-08-28 RX ORDER — PANTOPRAZOLE SODIUM 40 MG/1
40 TABLET, DELAYED RELEASE ORAL
Status: DISCONTINUED | OUTPATIENT
Start: 2019-08-28 | End: 2019-08-28 | Stop reason: HOSPADM

## 2019-08-28 RX ORDER — ASPIRIN 81 MG/1
81 TABLET, CHEWABLE ORAL DAILY
Qty: 30 TABLET | Refills: 0 | Status: SHIPPED | OUTPATIENT
Start: 2019-08-29

## 2019-08-28 RX ORDER — GABAPENTIN 600 MG/1
300 TABLET ORAL 2 TIMES DAILY
Status: DISCONTINUED | OUTPATIENT
Start: 2019-08-28 | End: 2019-08-28

## 2019-08-28 RX ORDER — LISINOPRIL 10 MG/1
10 TABLET ORAL DAILY
Status: ON HOLD | COMMUNITY
End: 2021-08-02 | Stop reason: HOSPADM

## 2019-08-28 RX ORDER — KETOROLAC TROMETHAMINE 30 MG/ML
15 INJECTION, SOLUTION INTRAMUSCULAR; INTRAVENOUS EVERY 6 HOURS PRN
Status: DISCONTINUED | OUTPATIENT
Start: 2019-08-28 | End: 2019-08-28 | Stop reason: HOSPADM

## 2019-08-28 RX ORDER — GABAPENTIN 600 MG/1
600 TABLET ORAL 2 TIMES DAILY
Status: DISCONTINUED | OUTPATIENT
Start: 2019-08-28 | End: 2019-08-28 | Stop reason: HOSPADM

## 2019-08-28 RX ORDER — BUPRENORPHINE AND NALOXONE 8; 2 MG/1; MG/1
1 FILM, SOLUBLE BUCCAL; SUBLINGUAL 3 TIMES DAILY
Status: DISCONTINUED | OUTPATIENT
Start: 2019-08-28 | End: 2019-08-28 | Stop reason: HOSPADM

## 2019-08-28 RX ORDER — ONDANSETRON 2 MG/ML
4 INJECTION INTRAMUSCULAR; INTRAVENOUS EVERY 6 HOURS PRN
Status: DISCONTINUED | OUTPATIENT
Start: 2019-08-28 | End: 2019-08-28 | Stop reason: HOSPADM

## 2019-08-28 RX ORDER — ONDANSETRON 2 MG/ML
4 INJECTION INTRAMUSCULAR; INTRAVENOUS ONCE
Status: COMPLETED | OUTPATIENT
Start: 2019-08-28 | End: 2019-08-28

## 2019-08-28 RX ORDER — ONDANSETRON 2 MG/ML
4 INJECTION INTRAMUSCULAR; INTRAVENOUS EVERY 6 HOURS PRN
Status: DISCONTINUED | OUTPATIENT
Start: 2019-08-28 | End: 2019-08-28 | Stop reason: SDUPTHER

## 2019-08-28 RX ORDER — LISINOPRIL 10 MG/1
10 TABLET ORAL DAILY
Status: DISCONTINUED | OUTPATIENT
Start: 2019-08-28 | End: 2019-08-28 | Stop reason: HOSPADM

## 2019-08-28 RX ORDER — ATORVASTATIN CALCIUM 40 MG/1
80 TABLET, FILM COATED ORAL NIGHTLY
Status: DISCONTINUED | OUTPATIENT
Start: 2019-08-28 | End: 2019-08-28 | Stop reason: HOSPADM

## 2019-08-28 RX ORDER — ACETAMINOPHEN 325 MG/1
650 TABLET ORAL EVERY 4 HOURS PRN
Status: DISCONTINUED | OUTPATIENT
Start: 2019-08-28 | End: 2019-08-28 | Stop reason: HOSPADM

## 2019-08-28 RX ORDER — OMEPRAZOLE 40 MG/1
40 CAPSULE, DELAYED RELEASE ORAL DAILY
COMMUNITY

## 2019-08-28 RX ORDER — SODIUM CHLORIDE 0.9 % (FLUSH) 0.9 %
10 SYRINGE (ML) INJECTION PRN
Status: DISCONTINUED | OUTPATIENT
Start: 2019-08-28 | End: 2019-08-28 | Stop reason: HOSPADM

## 2019-08-28 RX ORDER — BUPRENORPHINE AND NALOXONE 8; 2 MG/1; MG/1
1 FILM, SOLUBLE BUCCAL; SUBLINGUAL 3 TIMES DAILY
Status: ON HOLD | COMMUNITY
Start: 2019-08-28 | End: 2021-08-02 | Stop reason: HOSPADM

## 2019-08-28 RX ORDER — ONDANSETRON 4 MG/1
4 TABLET, FILM COATED ORAL EVERY 8 HOURS PRN
Status: DISCONTINUED | OUTPATIENT
Start: 2019-08-28 | End: 2019-08-28 | Stop reason: HOSPADM

## 2019-08-28 RX ORDER — MORPHINE SULFATE 4 MG/ML
4 INJECTION, SOLUTION INTRAMUSCULAR; INTRAVENOUS ONCE
Status: COMPLETED | OUTPATIENT
Start: 2019-08-28 | End: 2019-08-28

## 2019-08-28 RX ORDER — MORPHINE SULFATE 2 MG/ML
4 INJECTION, SOLUTION INTRAMUSCULAR; INTRAVENOUS EVERY 4 HOURS PRN
Status: DISCONTINUED | OUTPATIENT
Start: 2019-08-28 | End: 2019-08-28 | Stop reason: HOSPADM

## 2019-08-28 RX ORDER — ASPIRIN 81 MG/1
81 TABLET, CHEWABLE ORAL DAILY
Status: DISCONTINUED | OUTPATIENT
Start: 2019-08-29 | End: 2019-08-28 | Stop reason: HOSPADM

## 2019-08-28 RX ORDER — SODIUM CHLORIDE 0.9 % (FLUSH) 0.9 %
10 SYRINGE (ML) INJECTION EVERY 12 HOURS SCHEDULED
Status: DISCONTINUED | OUTPATIENT
Start: 2019-08-28 | End: 2019-08-28 | Stop reason: HOSPADM

## 2019-08-28 RX ORDER — ATORVASTATIN CALCIUM 80 MG/1
80 TABLET, FILM COATED ORAL NIGHTLY
Qty: 30 TABLET | Refills: 0 | Status: SHIPPED | OUTPATIENT
Start: 2019-08-28

## 2019-08-28 RX ADMIN — TETRAKIS(2-METHOXYISOBUTYLISOCYANIDE)COPPER(I) TETRAFLUOROBORATE 10 MILLICURIE: 1 INJECTION, POWDER, LYOPHILIZED, FOR SOLUTION INTRAVENOUS at 10:24

## 2019-08-28 RX ADMIN — Medication 10 ML: at 10:56

## 2019-08-28 RX ADMIN — ACETAMINOPHEN 650 MG: 325 TABLET ORAL at 10:56

## 2019-08-28 RX ADMIN — GABAPENTIN 600 MG: 600 TABLET, FILM COATED ORAL at 02:46

## 2019-08-28 RX ADMIN — ACETAMINOPHEN 650 MG: 325 TABLET ORAL at 06:59

## 2019-08-28 RX ADMIN — ACETAMINOPHEN 650 MG: 325 TABLET ORAL at 02:14

## 2019-08-28 RX ADMIN — ONDANSETRON 4 MG: 2 INJECTION INTRAMUSCULAR; INTRAVENOUS at 01:01

## 2019-08-28 RX ADMIN — LISINOPRIL 10 MG: 10 TABLET ORAL at 10:56

## 2019-08-28 RX ADMIN — TETRAKIS(2-METHOXYISOBUTYLISOCYANIDE)COPPER(I) TETRAFLUOROBORATE 30 MILLICURIE: 1 INJECTION, POWDER, LYOPHILIZED, FOR SOLUTION INTRAVENOUS at 10:25

## 2019-08-28 RX ADMIN — MORPHINE SULFATE 4 MG: 4 INJECTION INTRAVENOUS at 01:01

## 2019-08-28 RX ADMIN — GABAPENTIN 600 MG: 600 TABLET, FILM COATED ORAL at 10:56

## 2019-08-28 RX ADMIN — PANTOPRAZOLE SODIUM 40 MG: 40 TABLET, DELAYED RELEASE ORAL at 06:59

## 2019-08-28 ASSESSMENT — PAIN DESCRIPTION - ORIENTATION: ORIENTATION: LEFT

## 2019-08-28 ASSESSMENT — ENCOUNTER SYMPTOMS
EYE DISCHARGE: 0
COLOR CHANGE: 0
VOMITING: 0
NAUSEA: 1
DIARRHEA: 0
SHORTNESS OF BREATH: 1
SORE THROAT: 0
CHEST TIGHTNESS: 1
ABDOMINAL DISTENTION: 0
BLOOD IN STOOL: 0
EYE PAIN: 0
WHEEZING: 0
ALLERGIC/IMMUNOLOGIC NEGATIVE: 1
SINUS PAIN: 0
STRIDOR: 0
TROUBLE SWALLOWING: 0
EYE ITCHING: 0
PHOTOPHOBIA: 0
CONSTIPATION: 0
BACK PAIN: 0
ABDOMINAL PAIN: 0
EYE REDNESS: 0

## 2019-08-28 ASSESSMENT — PAIN SCALES - GENERAL
PAINLEVEL_OUTOF10: 4
PAINLEVEL_OUTOF10: 8
PAINLEVEL_OUTOF10: 7
PAINLEVEL_OUTOF10: 7
PAINLEVEL_OUTOF10: 8
PAINLEVEL_OUTOF10: 7
PAINLEVEL_OUTOF10: 4

## 2019-08-28 ASSESSMENT — PAIN DESCRIPTION - PAIN TYPE: TYPE: ACUTE PAIN

## 2019-08-28 ASSESSMENT — PAIN DESCRIPTION - LOCATION: LOCATION: CHEST;HEAD

## 2019-08-28 NOTE — ED PROVIDER NOTES
for Nausea or Vomiting  Qty: 10 tablet, Refills: 0      sucralfate (CARAFATE) 1 GM tablet Take 1 tablet by mouth 4 times daily  Qty: 120 tablet, Refills: 0      gabapentin (NEURONTIN) 600 MG tablet Take 600 mg by mouth 2 times daily. ALLERGIES     Benadryl [diphenhydramine] and Phenergan [promethazine hcl]    FAMILY HISTORY       Family History   Problem Relation Age of Onset    Heart Attack Mother     Diabetes Mother     Heart Disease Father     Heart Failure Father     Diabetes Father           SOCIAL HISTORY       Social History     Socioeconomic History    Marital status:      Spouse name: None    Number of children: None    Years of education: None    Highest education level: None   Occupational History    None   Social Needs    Financial resource strain: None    Food insecurity:     Worry: None     Inability: None    Transportation needs:     Medical: None     Non-medical: None   Tobacco Use    Smoking status: Never Smoker    Smokeless tobacco: Never Used   Substance and Sexual Activity    Alcohol use:  Yes    Drug use: No    Sexual activity: None   Lifestyle    Physical activity:     Days per week: None     Minutes per session: None    Stress: None   Relationships    Social connections:     Talks on phone: None     Gets together: None     Attends Gnosticism service: None     Active member of club or organization: None     Attends meetings of clubs or organizations: None     Relationship status: None    Intimate partner violence:     Fear of current or ex partner: None     Emotionally abused: None     Physically abused: None     Forced sexual activity: None   Other Topics Concern    None   Social History Narrative    None       SCREENINGS    Ed Coma Scale  Eye Opening: Spontaneous  Best Verbal Response: Oriented  Best Motor Response: Obeys commands  [place] Coma Scale Score: 15      PHYSICAL EXAM    (up to 7 forlevel 4, 8 or more for level 5)     ED Triage Vitals

## 2019-08-28 NOTE — H&P
[facility] Medicine  History and Physical    Patient:  Dallin Delgado  MRN: [id number]    CHIEF COMPLAINT:  Chest pain    History Obtained From: Patient  Family present for interview:     PCP: Judith Resendiz    HISTORY OF PRESENT ILLNESS:  60 y.o. female who presents with left pectoral chest pain that is nonradiating without associated nausea vomiting or diaphoresis. Patient complains of 1 to 2-month history of worsening dyspnea on exertion but denies lower extremity edema, orthopnea, paroxysmal nocturnal dyspnea or early satiety. In the emergency department routine laboratory studies showed mild normocytic anemia, serial troponins negative x3, D-dimer negative 0.29mcg/mL. Past medical history is significant for paroxysmal atrial fibrillation, hypertension, hypercholesterolemia and arthritis. Her family history is significant for 2 brothers and 2 sisters who all have early coronary artery disease 48 to 31-year-old    The patient is admitted to the medicine service for further evaluation and treatment of chest pain symptoms. REVIEW OF SYSTEMS:  Review of Systems  She denies fever, chills, nausea, vomiting. She states the chest pain feels like \"a fist going in my chest\" is a pressure-like sensation that is nonradiating she is unable to describe the severity, duration is been for at least one day, occurs at rest and with exertion. Chest pain symptoms did not resolve with treatment given in the emergency department. Patient denies smoking cigarettes or vaping. Patient denies chronic cough asthma or allergies. ,   All other 14 review of systems negative except as noted above.     Past Medical History:      Diagnosis Date    Arthritis     Fracture     Gastric ulcer     Hypertension     Neuropathy        Past Surgical History:      Procedure Laterality Date     SECTION      CHOLECYSTECTOMY      HYSTERECTOMY      MO EGD TRANSORAL BIOPSY SINGLE/MULTIPLE N/A 10/26/2018 exercise stress test.    -- DVT prophylaxis: SQ Lovenox      -- CODE STATUS: Full Code    Total face-to-face time with this patient, time spent reviewing medical records and in coordination of care with the emergency department physician, nursing staff was 75 minutes.     Signed:  Koby George MD 8/28/2019 6:37 AM  Admitting Hospitalist

## 2019-08-29 LAB
LV EF: 74 %
LVEF MODALITY: NORMAL

## 2021-02-09 ENCOUNTER — HOSPITAL ENCOUNTER (OUTPATIENT)
Dept: GENERAL RADIOLOGY | Facility: HOSPITAL | Age: 61
Discharge: HOME | End: 2021-02-09
Admitting: NURSE PRACTITIONER

## 2021-02-09 ENCOUNTER — TRANSCRIBE ORDERS (OUTPATIENT)
Dept: ADMINISTRATIVE | Facility: HOSPITAL | Age: 61
End: 2021-02-09

## 2021-02-09 DIAGNOSIS — M54.16 LUMBAR RADICULOPATHY: Primary | ICD-10-CM

## 2021-02-09 PROCEDURE — 72110 X-RAY EXAM L-2 SPINE 4/>VWS: CPT

## 2021-04-29 ENCOUNTER — TRANSCRIBE ORDERS (OUTPATIENT)
Dept: ADMINISTRATIVE | Facility: HOSPITAL | Age: 61
End: 2021-04-29

## 2021-05-17 ENCOUNTER — TRANSCRIBE ORDERS (OUTPATIENT)
Dept: ADMINISTRATIVE | Facility: HOSPITAL | Age: 61
End: 2021-05-17

## 2021-05-17 DIAGNOSIS — M47.816 LUMBAR SPONDYLOSIS: Primary | ICD-10-CM

## 2021-05-28 ENCOUNTER — APPOINTMENT (OUTPATIENT)
Dept: MRI IMAGING | Facility: HOSPITAL | Age: 61
End: 2021-05-28

## 2021-07-31 ENCOUNTER — APPOINTMENT (OUTPATIENT)
Dept: MRI IMAGING | Age: 61
DRG: 948 | End: 2021-07-31
Payer: MEDICAID

## 2021-07-31 ENCOUNTER — HOSPITAL ENCOUNTER (INPATIENT)
Age: 61
LOS: 2 days | Discharge: HOME | DRG: 948 | End: 2021-08-02
Attending: EMERGENCY MEDICINE | Admitting: INTERNAL MEDICINE
Payer: MEDICAID

## 2021-07-31 ENCOUNTER — APPOINTMENT (OUTPATIENT)
Dept: CT IMAGING | Age: 61
DRG: 948 | End: 2021-07-31
Payer: MEDICAID

## 2021-07-31 ENCOUNTER — APPOINTMENT (OUTPATIENT)
Dept: GENERAL RADIOLOGY | Age: 61
DRG: 948 | End: 2021-07-31
Payer: MEDICAID

## 2021-07-31 DIAGNOSIS — R41.82 ALTERED MENTAL STATUS, UNSPECIFIED ALTERED MENTAL STATUS TYPE: Primary | ICD-10-CM

## 2021-07-31 LAB
ACETAMINOPHEN LEVEL: <15 UG/ML
ALBUMIN SERPL-MCNC: 5 G/DL (ref 3.5–5.2)
ALP BLD-CCNC: 104 U/L (ref 35–104)
ALT SERPL-CCNC: 15 U/L (ref 5–33)
AMPHETAMINE SCREEN, URINE: NEGATIVE
ANION GAP SERPL CALCULATED.3IONS-SCNC: 16 MMOL/L (ref 7–19)
APTT: 29.9 SEC (ref 26–36.2)
AST SERPL-CCNC: 17 U/L (ref 5–32)
BACTERIA: NEGATIVE /HPF
BARBITURATE SCREEN URINE: NEGATIVE
BASOPHILS ABSOLUTE: 0.1 K/UL (ref 0–0.2)
BASOPHILS RELATIVE PERCENT: 0.6 % (ref 0–1)
BENZODIAZEPINE SCREEN, URINE: NEGATIVE
BILIRUB SERPL-MCNC: <0.2 MG/DL (ref 0.2–1.2)
BILIRUBIN URINE: NEGATIVE
BLOOD, URINE: NEGATIVE
BUN BLDV-MCNC: 17 MG/DL (ref 8–23)
CALCIUM SERPL-MCNC: 9.9 MG/DL (ref 8.8–10.2)
CANNABINOID SCREEN URINE: NEGATIVE
CHLORIDE BLD-SCNC: 106 MMOL/L (ref 98–111)
CLARITY: CLEAR
CO2: 17 MMOL/L (ref 22–29)
COCAINE METABOLITE SCREEN URINE: NEGATIVE
COLOR: YELLOW
CREAT SERPL-MCNC: 0.7 MG/DL (ref 0.5–0.9)
CRYSTALS, UA: ABNORMAL /HPF
EOSINOPHILS ABSOLUTE: 0.1 K/UL (ref 0–0.6)
EOSINOPHILS RELATIVE PERCENT: 1.3 % (ref 0–5)
EPITHELIAL CELLS, UA: 5 /HPF (ref 0–5)
ETHANOL: <10 MG/DL (ref 0–0.08)
GFR AFRICAN AMERICAN: >59
GFR NON-AFRICAN AMERICAN: >60
GLUCOSE BLD-MCNC: 128 MG/DL (ref 70–99)
GLUCOSE BLD-MCNC: 149 MG/DL (ref 74–109)
GLUCOSE URINE: NEGATIVE MG/DL
HCT VFR BLD CALC: 37.2 % (ref 37–47)
HEMOGLOBIN: 11.4 G/DL (ref 12–16)
HYALINE CASTS: 10 /HPF (ref 0–8)
IMMATURE GRANULOCYTES #: 0.1 K/UL
INR BLD: 0.98 (ref 0.88–1.18)
KETONES, URINE: NEGATIVE MG/DL
LEUKOCYTE ESTERASE, URINE: NEGATIVE
LIPASE: 35 U/L (ref 13–60)
LYMPHOCYTES ABSOLUTE: 2.7 K/UL (ref 1.1–4.5)
LYMPHOCYTES RELATIVE PERCENT: 25 % (ref 20–40)
Lab: NORMAL
MCH RBC QN AUTO: 29.6 PG (ref 27–31)
MCHC RBC AUTO-ENTMCNC: 30.6 G/DL (ref 33–37)
MCV RBC AUTO: 96.6 FL (ref 81–99)
MONOCYTES ABSOLUTE: 0.6 K/UL (ref 0–0.9)
MONOCYTES RELATIVE PERCENT: 5.2 % (ref 0–10)
NEUTROPHILS ABSOLUTE: 7.2 K/UL (ref 1.5–7.5)
NEUTROPHILS RELATIVE PERCENT: 67.3 % (ref 50–65)
NITRITE, URINE: NEGATIVE
OPIATE SCREEN URINE: NEGATIVE
PDW BLD-RTO: 15.8 % (ref 11.5–14.5)
PERFORMED ON: ABNORMAL
PH UA: 6 (ref 5–8)
PLATELET # BLD: 475 K/UL (ref 130–400)
PMV BLD AUTO: 9.3 FL (ref 9.4–12.3)
POTASSIUM SERPL-SCNC: 3.8 MMOL/L (ref 3.5–5)
PROTEIN UA: 100 MG/DL
PROTHROMBIN TIME: 13.2 SEC (ref 12–14.6)
RBC # BLD: 3.85 M/UL (ref 4.2–5.4)
RBC UA: 2 /HPF (ref 0–4)
SALICYLATE, SERUM: <3 MG/DL (ref 3–10)
SARS-COV-2, NAAT: NOT DETECTED
SODIUM BLD-SCNC: 139 MMOL/L (ref 136–145)
SPECIFIC GRAVITY UA: 1.02 (ref 1–1.03)
TOTAL PROTEIN: 8.3 G/DL (ref 6.6–8.7)
TROPONIN: 0.02 NG/ML (ref 0–0.03)
UROBILINOGEN, URINE: 1 E.U./DL
WBC # BLD: 10.7 K/UL (ref 4.8–10.8)
WBC UA: 2 /HPF (ref 0–5)

## 2021-07-31 PROCEDURE — 85730 THROMBOPLASTIN TIME PARTIAL: CPT

## 2021-07-31 PROCEDURE — 70450 CT HEAD/BRAIN W/O DYE: CPT

## 2021-07-31 PROCEDURE — 82077 ASSAY SPEC XCP UR&BREATH IA: CPT

## 2021-07-31 PROCEDURE — 82947 ASSAY GLUCOSE BLOOD QUANT: CPT

## 2021-07-31 PROCEDURE — 87635 SARS-COV-2 COVID-19 AMP PRB: CPT

## 2021-07-31 PROCEDURE — 6360000004 HC RX CONTRAST MEDICATION: Performed by: EMERGENCY MEDICINE

## 2021-07-31 PROCEDURE — 70551 MRI BRAIN STEM W/O DYE: CPT

## 2021-07-31 PROCEDURE — 96374 THER/PROPH/DIAG INJ IV PUSH: CPT

## 2021-07-31 PROCEDURE — 85025 COMPLETE CBC W/AUTO DIFF WBC: CPT

## 2021-07-31 PROCEDURE — C9113 INJ PANTOPRAZOLE SODIUM, VIA: HCPCS | Performed by: EMERGENCY MEDICINE

## 2021-07-31 PROCEDURE — 84484 ASSAY OF TROPONIN QUANT: CPT

## 2021-07-31 PROCEDURE — 2580000003 HC RX 258: Performed by: INTERNAL MEDICINE

## 2021-07-31 PROCEDURE — 71045 X-RAY EXAM CHEST 1 VIEW: CPT

## 2021-07-31 PROCEDURE — G0378 HOSPITAL OBSERVATION PER HR: HCPCS

## 2021-07-31 PROCEDURE — 1210000000 HC MED SURG R&B

## 2021-07-31 PROCEDURE — 6370000000 HC RX 637 (ALT 250 FOR IP): Performed by: INTERNAL MEDICINE

## 2021-07-31 PROCEDURE — 6360000002 HC RX W HCPCS: Performed by: EMERGENCY MEDICINE

## 2021-07-31 PROCEDURE — 81001 URINALYSIS AUTO W/SCOPE: CPT

## 2021-07-31 PROCEDURE — 96375 TX/PRO/DX INJ NEW DRUG ADDON: CPT

## 2021-07-31 PROCEDURE — 83690 ASSAY OF LIPASE: CPT

## 2021-07-31 PROCEDURE — 80143 DRUG ASSAY ACETAMINOPHEN: CPT

## 2021-07-31 PROCEDURE — 74177 CT ABD & PELVIS W/CONTRAST: CPT

## 2021-07-31 PROCEDURE — 85610 PROTHROMBIN TIME: CPT

## 2021-07-31 PROCEDURE — 80053 COMPREHEN METABOLIC PANEL: CPT

## 2021-07-31 PROCEDURE — 99283 EMERGENCY DEPT VISIT LOW MDM: CPT

## 2021-07-31 PROCEDURE — 93005 ELECTROCARDIOGRAM TRACING: CPT

## 2021-07-31 PROCEDURE — 95819 EEG AWAKE AND ASLEEP: CPT

## 2021-07-31 PROCEDURE — 36415 COLL VENOUS BLD VENIPUNCTURE: CPT

## 2021-07-31 PROCEDURE — 95819 EEG AWAKE AND ASLEEP: CPT | Performed by: PSYCHIATRY & NEUROLOGY

## 2021-07-31 PROCEDURE — 4A10X4Z MONITORING OF CENTRAL NERVOUS ELECTRICAL ACTIVITY, EXTERNAL APPROACH: ICD-10-PCS | Performed by: INTERNAL MEDICINE

## 2021-07-31 PROCEDURE — 80307 DRUG TEST PRSMV CHEM ANLYZR: CPT

## 2021-07-31 PROCEDURE — 96376 TX/PRO/DX INJ SAME DRUG ADON: CPT

## 2021-07-31 PROCEDURE — 80179 DRUG ASSAY SALICYLATE: CPT

## 2021-07-31 RX ORDER — BUPRENORPHINE AND NALOXONE 8; 2 MG/1; MG/1
1 FILM, SOLUBLE BUCCAL; SUBLINGUAL 3 TIMES DAILY
Status: DISCONTINUED | OUTPATIENT
Start: 2021-07-31 | End: 2021-08-01

## 2021-07-31 RX ORDER — SODIUM CHLORIDE 0.9 % (FLUSH) 0.9 %
5-40 SYRINGE (ML) INJECTION PRN
Status: DISCONTINUED | OUTPATIENT
Start: 2021-07-31 | End: 2021-08-02 | Stop reason: HOSPADM

## 2021-07-31 RX ORDER — ATORVASTATIN CALCIUM 40 MG/1
80 TABLET, FILM COATED ORAL NIGHTLY
Status: DISCONTINUED | OUTPATIENT
Start: 2021-07-31 | End: 2021-08-02 | Stop reason: HOSPADM

## 2021-07-31 RX ORDER — SODIUM BICARBONATE 650 MG/1
650 TABLET ORAL 4 TIMES DAILY
Status: DISCONTINUED | OUTPATIENT
Start: 2021-07-31 | End: 2021-08-02 | Stop reason: HOSPADM

## 2021-07-31 RX ORDER — PANTOPRAZOLE SODIUM 40 MG/1
40 TABLET, DELAYED RELEASE ORAL
Status: DISCONTINUED | OUTPATIENT
Start: 2021-08-01 | End: 2021-08-01

## 2021-07-31 RX ORDER — DEXTROSE MONOHYDRATE 50 MG/ML
100 INJECTION, SOLUTION INTRAVENOUS PRN
Status: DISCONTINUED | OUTPATIENT
Start: 2021-07-31 | End: 2021-08-02 | Stop reason: HOSPADM

## 2021-07-31 RX ORDER — DEXTROSE MONOHYDRATE 25 G/50ML
12.5 INJECTION, SOLUTION INTRAVENOUS PRN
Status: DISCONTINUED | OUTPATIENT
Start: 2021-07-31 | End: 2021-08-02 | Stop reason: HOSPADM

## 2021-07-31 RX ORDER — ONDANSETRON 2 MG/ML
4 INJECTION INTRAMUSCULAR; INTRAVENOUS EVERY 6 HOURS PRN
Status: DISCONTINUED | OUTPATIENT
Start: 2021-07-31 | End: 2021-08-02 | Stop reason: HOSPADM

## 2021-07-31 RX ORDER — MAGNESIUM SULFATE IN WATER 40 MG/ML
2000 INJECTION, SOLUTION INTRAVENOUS PRN
Status: DISCONTINUED | OUTPATIENT
Start: 2021-07-31 | End: 2021-08-02 | Stop reason: HOSPADM

## 2021-07-31 RX ORDER — SODIUM CHLORIDE 9 MG/ML
INJECTION, SOLUTION INTRAVENOUS CONTINUOUS
Status: DISCONTINUED | OUTPATIENT
Start: 2021-07-31 | End: 2021-08-02 | Stop reason: HOSPADM

## 2021-07-31 RX ORDER — ONDANSETRON 4 MG/1
4 TABLET, ORALLY DISINTEGRATING ORAL EVERY 8 HOURS PRN
Status: DISCONTINUED | OUTPATIENT
Start: 2021-07-31 | End: 2021-08-02 | Stop reason: HOSPADM

## 2021-07-31 RX ORDER — SODIUM CHLORIDE 0.9 % (FLUSH) 0.9 %
5-40 SYRINGE (ML) INJECTION EVERY 12 HOURS SCHEDULED
Status: DISCONTINUED | OUTPATIENT
Start: 2021-07-31 | End: 2021-08-02 | Stop reason: HOSPADM

## 2021-07-31 RX ORDER — SODIUM CHLORIDE 9 MG/ML
25 INJECTION, SOLUTION INTRAVENOUS PRN
Status: DISCONTINUED | OUTPATIENT
Start: 2021-07-31 | End: 2021-08-02 | Stop reason: HOSPADM

## 2021-07-31 RX ORDER — POTASSIUM CHLORIDE 20 MEQ/1
40 TABLET, EXTENDED RELEASE ORAL PRN
Status: DISCONTINUED | OUTPATIENT
Start: 2021-07-31 | End: 2021-08-02 | Stop reason: HOSPADM

## 2021-07-31 RX ORDER — POLYETHYLENE GLYCOL 3350 17 G/17G
17 POWDER, FOR SOLUTION ORAL DAILY PRN
Status: DISCONTINUED | OUTPATIENT
Start: 2021-07-31 | End: 2021-08-02 | Stop reason: HOSPADM

## 2021-07-31 RX ORDER — NICOTINE POLACRILEX 4 MG
15 LOZENGE BUCCAL PRN
Status: DISCONTINUED | OUTPATIENT
Start: 2021-07-31 | End: 2021-08-02 | Stop reason: HOSPADM

## 2021-07-31 RX ORDER — ASPIRIN 81 MG/1
81 TABLET, CHEWABLE ORAL DAILY
Status: DISCONTINUED | OUTPATIENT
Start: 2021-07-31 | End: 2021-08-02 | Stop reason: HOSPADM

## 2021-07-31 RX ORDER — PANTOPRAZOLE SODIUM 40 MG/10ML
40 INJECTION, POWDER, LYOPHILIZED, FOR SOLUTION INTRAVENOUS ONCE
Status: COMPLETED | OUTPATIENT
Start: 2021-07-31 | End: 2021-07-31

## 2021-07-31 RX ORDER — POTASSIUM CHLORIDE 7.45 MG/ML
10 INJECTION INTRAVENOUS PRN
Status: DISCONTINUED | OUTPATIENT
Start: 2021-07-31 | End: 2021-08-02 | Stop reason: HOSPADM

## 2021-07-31 RX ORDER — SUCRALFATE 1 G/1
1 TABLET ORAL 4 TIMES DAILY
Status: DISCONTINUED | OUTPATIENT
Start: 2021-07-31 | End: 2021-08-02 | Stop reason: HOSPADM

## 2021-07-31 RX ORDER — LISINOPRIL 10 MG/1
10 TABLET ORAL DAILY
Status: DISCONTINUED | OUTPATIENT
Start: 2021-07-31 | End: 2021-08-02 | Stop reason: HOSPADM

## 2021-07-31 RX ORDER — GABAPENTIN 600 MG/1
600 TABLET ORAL 2 TIMES DAILY
Status: DISCONTINUED | OUTPATIENT
Start: 2021-07-31 | End: 2021-08-02 | Stop reason: HOSPADM

## 2021-07-31 RX ORDER — ACETAMINOPHEN 325 MG/1
650 TABLET ORAL EVERY 6 HOURS PRN
Status: DISCONTINUED | OUTPATIENT
Start: 2021-07-31 | End: 2021-08-02 | Stop reason: HOSPADM

## 2021-07-31 RX ORDER — ACETAMINOPHEN 650 MG/1
650 SUPPOSITORY RECTAL EVERY 6 HOURS PRN
Status: DISCONTINUED | OUTPATIENT
Start: 2021-07-31 | End: 2021-08-02 | Stop reason: HOSPADM

## 2021-07-31 RX ADMIN — SODIUM CHLORIDE, PRESERVATIVE FREE 10 ML: 5 INJECTION INTRAVENOUS at 20:42

## 2021-07-31 RX ADMIN — GABAPENTIN 600 MG: 600 TABLET, FILM COATED ORAL at 20:41

## 2021-07-31 RX ADMIN — SUCRALFATE 1 G: 1 TABLET ORAL at 20:42

## 2021-07-31 RX ADMIN — ATORVASTATIN CALCIUM 80 MG: 40 TABLET, FILM COATED ORAL at 20:42

## 2021-07-31 RX ADMIN — SODIUM CHLORIDE, PRESERVATIVE FREE 10 ML: 5 INJECTION INTRAVENOUS at 20:49

## 2021-07-31 RX ADMIN — SODIUM BICARBONATE 650 MG: 650 TABLET ORAL at 20:42

## 2021-07-31 RX ADMIN — IOPAMIDOL 90 ML: 755 INJECTION, SOLUTION INTRAVENOUS at 13:36

## 2021-07-31 RX ADMIN — METOPROLOL TARTRATE 12.5 MG: 25 TABLET, FILM COATED ORAL at 20:42

## 2021-07-31 RX ADMIN — SODIUM CHLORIDE: 9 INJECTION, SOLUTION INTRAVENOUS at 20:43

## 2021-07-31 RX ADMIN — PANTOPRAZOLE SODIUM 40 MG: 40 INJECTION, POWDER, FOR SOLUTION INTRAVENOUS at 14:12

## 2021-07-31 RX ADMIN — LISINOPRIL 10 MG: 10 TABLET ORAL at 20:58

## 2021-07-31 RX ADMIN — ASPIRIN 81 MG: 81 TABLET, CHEWABLE ORAL at 20:58

## 2021-07-31 NOTE — H&P
[facility] - History & Physical    [id number]  PCP: Sara Jacobson  Date of Admission: 2021   Date of Service: Pt seen/examined on2021 and Admitted to Inpatient with expected LOS greater than two midnights due to medical therapy. Chief Complaint: AMS    History Of Present Illness: The patient is a 63 y.o. female with a past medical history of hypertension GERD who presented to the ED complaining of AMS that has been present for the last few days. Patient is alert and oriented x2 (person, place) during my evaluation denies weakness or numbness or previous similar episodes. She has a lot of difficulty telling me what is going on and is an extremely poor historian. Work-up so far has been grossly unremarkable including MRI brain, UA, chest x-ray, lab work. Past Medical History:        Diagnosis Date    Arthritis     Fracture     Gastric ulcer     Hypertension     Neuropathy        Past Surgical History:        Procedure Laterality Date     SECTION      CHOLECYSTECTOMY      HYSTERECTOMY      ME EGD TRANSORAL BIOPSY SINGLE/MULTIPLE N/A 10/26/2018    Dr Leda Wheeler ulcer-Active esophagitis, repeat in 2 month    SHOULDER ARTHROSCOPY Right 2019    RIGHT SHOULDER ARTHROSCOPIC ROTATOR CUFF REPAIR, SUBACROMIAL DECOMPRESSION performed by Saulo Nelson MD at [address] [place] [address] Medications:  Prior to Admission medications    Medication Sig Start Date End Date Taking? Authorizing Provider   lisinopril (PRINIVIL;ZESTRIL) 10 MG tablet Take 10 mg by mouth daily    Historical Provider, MD   omeprazole (PRILOSEC) 40 MG delayed release capsule Take 40 mg by mouth daily    Historical Provider, MD   buprenorphine-naloxone (SUBOXONE) 8-2 MG FILM SL film Place 1 Film under the tongue three times daily.  Patient takes at 8AM,12pm ,4pm 19   Historical Provider, MD   aspirin 81 MG chewable tablet Take 1 tablet by mouth daily 19   Mehul [employer], DO atorvastatin (LIPITOR) 80 MG tablet Take 1 tablet by mouth nightly 8/28/19   Refugio Villavicencio, DO   metoprolol tartrate (LOPRESSOR) 25 MG tablet Take 0.5 tablets by mouth 2 times daily 8/28/19   Romaine Bacon, DO   ondansetron (ZOFRAN) 4 MG tablet Take 1 tablet by mouth every 8 hours as needed for Nausea or Vomiting 5/30/19   Tahira Mei MD   sucralfate (CARAFATE) 1 GM tablet Take 1 tablet by mouth 4 times daily 10/28/18   Luis Manuel Fraire DO   gabapentin (NEURONTIN) 600 MG tablet Take 600 mg by mouth 2 times daily. Historical Provider, MD       Allergies:    Benadryl [diphenhydramine] and Phenergan [promethazine hcl]    Social History:    Tobacco:   reports that she has never smoked. She has never used smokeless tobacco.  Alcohol:   reports current alcohol use.   Illicit Drugs: denies    Family History:      Problem Relation Age of Onset    Heart Attack Mother     Diabetes Mother     Heart Disease Father     Heart Failure Father     Diabetes Father        Review of Systems:   Unable to obtain based on mental status      Physical Examination:  /84   Pulse 74   Temp 98.2 °F (36.8 °C)   Resp 18   Ht 5' 4\" (1.626 m)   Wt 150 lb (68 kg)   SpO2 96%   BMI 25.75 kg/m²      General appearance: Alert  Head: NC/AT  Eyes: conjunctivae/corneas clear   Ears: normal external ears  Neck: Supple  Lungs: BLAE, no wheezing appreciated   Heart: RRR, no murmurs appreciated  Abdomen: BS+, soft, NT, ND  Extremities: no edema, pulses+  Skin: warm  Neurologic: AAOx2 (person, place), gross motor function intact  Psychiatric:  calm        Diagnostic Data:    CBC:  Recent Labs     07/31/21  1240   WBC 10.7   HGB 11.4*   HCT 37.2   *     BMP:  Recent Labs     07/31/21  1240      K 3.8      CO2 17*   BUN 17   CREATININE 0.7   CALCIUM 9.9     Recent Labs     07/31/21  1240   AST 17   ALT 15   BILITOT <0.2   ALKPHOS 104     Coag Panel:   Recent Labs     07/31/21  1240   INR 0.98   PROTIME 13.2   APTT 29.9 Cardiac Enzymes:   Recent Labs     07/31/21  1240   TROPONINI 0.02     ABGs:  Lab Results   Component Value Date    PHART 7.500 10/25/2018    PO2ART 77.0 10/25/2018    UXM2BIQ 31.0 10/25/2018     Urinalysis:  Lab Results   Component Value Date    NITRU Negative 07/31/2021    WBCUA 2 07/31/2021    BACTERIA NEGATIVE 07/31/2021    RBCUA 2 07/31/2021    BLOODU Negative 07/31/2021    SPECGRAV 1.021 07/31/2021    GLUCOSEU Negative 07/31/2021     RAD:     MRI BRAIN WO CONTRAST [[id number]] Resulted: 07/31/21 1648      Order Status: Completed Updated: 07/31/21 1650     Narrative:       EXAM: MRI BRAIN WO CONTRAST -- 7/31/2021 4:06 PM   HISTORY: 63 years, Female, confusion, fall, altered mental status   COMPARISON: CT head 7/31/2021   TECHNIQUE:   Routine pulse sequences of the brain were obtained without IV   contrast.   FINDINGS:   No acute intracranial hemorrhage, midline shift, mass effect, or   restricted diffusion to indicate acute infarct. Ventricles, cisterns   and sulci are normal in size and configuration. Sella and midline   structures within normal limits. Brainstem and posterior fossa are   within normal limits. Visualized flow voids within normal limits. Visualized portions of the   orbits, paranasal sinuses and mastoid air cells are within normal   limits. Visualized overlying soft tissues within normal limits.     Impression:       1. No acute or suspicious intracranial finding. Signed by Dr Daphne Whitehead Additional Contrast? None [[id number]] Resulted: 07/31/21 1355     Order Status: Completed Updated: 07/31/21 1357     Narrative:       EXAM: CT ABDOMEN PELVIS W IV CONTRAST -- 7/31/2021 1:35 PM   HISTORY: 60 years, Female, abdominal pain   COMPARISON: 8/15/2015   DLP: 1329 mGy cm. Automated exposure control was utilized to minimize   patient radiation dose. TECHNIQUE: Enhanced CT images obtained of the abdomen and pelvis with   multiplanar reformats.    FINDINGS: LUNG BASES: No acute findings of the lung bases. No inferior   pericardial or pleural effusion. LIVER: The liver is normal in size and contour. Patent portal and   hepatic veins. GALLBLADDER and BILARY: The gallbladder is absent. No intra-or   extrahepatic biliary ductal dilatation. PANCREAS: Normal.   SPLEEN: Normal size. Similar splenic contour compared to prior. ADRENALS: No adrenal mass. URINARY:   No hydronephrosis, calcified nephrolithiasis or solid renal mass. Urinary bladder is underdistended, which limits evaluation. Uterus absent. Adnexa is not discretely identified. PERITONEUM: No ascites/free fluid. No pneumoperitoneum. BOWEL: Tiny hiatal hernia. 2 cm diverticulum at the proximal duodenum. Moderate amount of colonic stool. No evidence of bowel obstruction. Normal appendix on axial images 73-57. No abnormal bowel wall   thickening.       RETROPERITONEUM:  Aorta normal in course and caliber. Main branch   vessels patent. Bilateral renal veins patent. No abdominal or pelvic   adenopathy. ABDOMINAL WALL: Normal.   OSSEOUS: No acute or destructive osseous lesions. Degenerative changes   of the spine.     Impression:       1. No acute intra-abdominal finding. 2. Moderate amount of left-sided and sigmoid colon stool. Signed by Dr Flaco Maki [[id number]] Resulted: 07/31/21 1350     Order Status: Completed Updated: 07/31/21 1352     Narrative:       EXAM: CT HEAD WO CONTRAST -- 7/31/2021 1:45 PM   HISTORY: 60 years, Female, confusion, altered mental status   COMPARISON: 1/24/2016   DLP: 803 mGy cm. Automated exposure control was utilized to minimize   patient radiation dose. TECHNIQUE: Unenhanced axial CT images obtained from vertex to skull   base with coronal and sagittal reformats. FINDINGS:   No acute intracranial hemorrhage, midline shift, mass effect or large   territory cortical infarct. Gray-white matter differentiation   maintained.  Ventricles,

## 2021-07-31 NOTE — ED NOTES
Bed: 03  Expected date:   Expected time:   Means of arrival:   Comments:  Trent Anderson RN  07/31/21 0475

## 2021-07-31 NOTE — PROGRESS NOTES
4 Eyes Skin Assessment    Juan Doran is being assessed upon: Admission    I agree that David Redd RN, along with *** (either 2 RN's or 1 LPN and 1 RN) have performed a thorough Head to Toe Skin Assessment on the patient. ALL assessment sites listed below have been assessed. Areas assessed by both nurses:     [x]   Head, Face, and Ears   [x]   Shoulders, Back, and Chest  [x]   Arms, Elbows, and Hands   [x]   Coccyx, Sacrum, and Ischium  [x]   Legs, Feet, and Heels    Does the Patient have Skin Breakdown?  No    Fernando Prevention initiated: No  Wound Care Orders initiated: No    WOC nurse consulted for Pressure Injury (Stage 3,4, Unstageable, DTI, NWPT, and Complex wounds) and New or Established Ostomies: No        Primary Nurse eSignature: Santos Linares RN on 7/31/2021 at 6:12 PM      Co-Signer eSignature: {Esignature:[id number]}

## 2021-07-31 NOTE — ED PROVIDER NOTES
[address] [facility] EMERGENCY DEPT  eMERGENCY dEPARTMENT eNCOUnter      Pt Name: Lalitha Simmons  MRN: [id number]  [place] 1960  Date of evaluation: 2021  Provider: Randa Lilly MD    CHIEF COMPLAINT       Chief Complaint   Patient presents with    Altered Mental Status     pt presents with ams          HISTORY OF PRESENT ILLNESS   (Location/Symptom, Timing/Onset,Context/Setting, Quality, Duration, Modifying Factors, Severity)  Note limiting factors. Lalitha Simmons is a 63 y.o. female who presents to the emergency department for altered mental status. Patient states that she has been confused the past several days and her family is who called EMS for her. Does admit that she had a fall recently within the past few days but she is unsure if this was in the night or in the morning. She also believes she could have a ulcer and states that her stools have been black today. She admits to some mild lower abdominal pain. Denies any vomiting. No cough or upper respiratory symptoms. Admits to dysuria but no other UTI symptoms. Subjective fever and chills. Blood sugar was 139 with EMS. HPI    NursingNotes were reviewed.     REVIEW OF SYSTEMS    (2-9 systems for level 4, 10 or more for level 5)     Review of Systems   Unable to perform ROS: Mental status change            PAST MEDICALHISTORY     Past Medical History:   Diagnosis Date    Arthritis     Fracture     Gastric ulcer     Hypertension     Neuropathy          SURGICAL HISTORY       Past Surgical History:   Procedure Laterality Date     SECTION      CHOLECYSTECTOMY      HYSTERECTOMY      MA EGD TRANSORAL BIOPSY SINGLE/MULTIPLE N/A 10/26/2018    Dr Judith Del Rio ulcer-Active esophagitis, repeat in 2 month    SHOULDER ARTHROSCOPY Right 2019    RIGHT SHOULDER ARTHROSCOPIC ROTATOR CUFF REPAIR, SUBACROMIAL DECOMPRESSION performed by Karmen Garcia MD at [address] [place] [address]     Previous Medications    ASPIRIN 81 MG CHEWABLE TABLET Take 1 tablet by mouth daily    ATORVASTATIN (LIPITOR) 80 MG TABLET    Take 1 tablet by mouth nightly    BUPRENORPHINE-NALOXONE (SUBOXONE) 8-2 MG FILM SL FILM    Place 1 Film under the tongue three times daily. Patient takes at 8AM,12pm ,4pm    GABAPENTIN (NEURONTIN) 600 MG TABLET    Take 600 mg by mouth 2 times daily. LISINOPRIL (PRINIVIL;ZESTRIL) 10 MG TABLET    Take 10 mg by mouth daily    METOPROLOL TARTRATE (LOPRESSOR) 25 MG TABLET    Take 0.5 tablets by mouth 2 times daily    OMEPRAZOLE (PRILOSEC) 40 MG DELAYED RELEASE CAPSULE    Take 40 mg by mouth daily    ONDANSETRON (ZOFRAN) 4 MG TABLET    Take 1 tablet by mouth every 8 hours as needed for Nausea or Vomiting    SUCRALFATE (CARAFATE) 1 GM TABLET    Take 1 tablet by mouth 4 times daily       ALLERGIES     Benadryl [diphenhydramine] and Phenergan [promethazine hcl]    FAMILY HISTORY       Family History   Problem Relation Age of Onset    Heart Attack Mother     Diabetes Mother     Heart Disease Father     Heart Failure Father     Diabetes Father           SOCIAL HISTORY       Social History     Socioeconomic History    Marital status:      Spouse name: None    Number of children: None    Years of education: None    Highest education level: None   Occupational History    None   Tobacco Use    Smoking status: Never Smoker    Smokeless tobacco: Never Used   Substance and Sexual Activity    Alcohol use: Yes    Drug use: No    Sexual activity: None   Other Topics Concern    None   Social History Narrative    None     Social Determinants of Health     Financial Resource Strain:     Difficulty of Paying Living Expenses:    Food Insecurity:     Worried About Running Out of Food in the Last Year:     [address] in the Last Year:    Transportation Needs:     Lack of Transportation (Medical):      Lack of Transportation (Non-Medical):    Physical Activity:     Days of Exercise per Week:     Minutes of Exercise per Session: Stress:     Feeling of Stress :    Social Connections:     Frequency of Communication with Friends and Family:     Frequency of Social Gatherings with Friends and Family:     Attends Mu-ism Services:     Active Member of Clubs or Organizations:     Attends Club or Organization Meetings:     Marital Status:    Intimate Partner Violence:     Fear of Current or Ex-Partner:     Emotionally Abused:     Physically Abused:     Sexually Abused:        SCREENINGS    Ed Coma Scale  Eye Opening: Spontaneous  Best Verbal Response: Confused  Best Motor Response: Obeys commands  [place] Coma Scale Score: 14        PHYSICAL EXAM    (up to 7 for level 4, 8 or more for level 5)     ED Triage Vitals [07/31/21 1229]   BP Temp Temp src Pulse Resp SpO2 Height Weight   133/89 98.2 °F (36.8 °C) -- 78 18 95 % 5' 4\" (1.626 m) 150 lb (68 kg)       Physical Exam  Vitals and nursing note reviewed. Exam conducted with a chaperone present. Constitutional:       General: She is not in acute distress. Appearance: Normal appearance. She is well-developed. She is not ill-appearing. HENT:      Head: Normocephalic and atraumatic. Right Ear: External ear normal.      Left Ear: External ear normal.      Nose: Nose normal.      Mouth/Throat:      Mouth: Mucous membranes are moist.   Eyes:      Conjunctiva/sclera: Conjunctivae normal.   Neck:      Trachea: No tracheal deviation. Cardiovascular:      Rate and Rhythm: Normal rate and regular rhythm. Heart sounds: Normal heart sounds. No murmur heard. Pulmonary:      Effort: No respiratory distress. Breath sounds: Normal breath sounds. No wheezing or rales. Abdominal:      General: Abdomen is flat. Palpations: Abdomen is soft. There is no mass. Tenderness: There is abdominal tenderness in the right lower quadrant, suprapubic area and left lower quadrant. There is no right CVA tenderness or left CVA tenderness.    Genitourinary:     Rectum: Guaiac result negative. Musculoskeletal:         General: Normal range of motion. Cervical back: Normal range of motion. Skin:     General: Skin is warm and dry. Neurological:      Mental Status: She is alert. GCS: GCS eye subscore is 4. GCS verbal subscore is 4. GCS motor subscore is 6. Cranial Nerves: No dysarthria or facial asymmetry. Motor: No abnormal muscle tone. Coordination: Coordination normal.      Comments: Speech is clear moving all extremities symmetrically         DIAGNOSTIC RESULTS     EKG: All EKG's areinterpreted by the Emergency Department Physician who either signs or Co-signs this chart in the absence of a cardiologist.    62 normal sinus rhythm no obvious ST changes nondiagnostic EKG    RADIOLOGY:  Non-plain film images such as CT, Ultrasound and MRI are read by the radiologist. Plain radiographic images are visualized and preliminarily interpreted bythe emergency physician with the below findings:        CT ABDOMEN PELVIS W IV CONTRAST Additional Contrast? None   Final Result   1. No acute intra-abdominal finding. 2. Moderate amount of left-sided and sigmoid colon stool. Signed by Dr Hyun Kwong   Final Result   1. No acute intracranial findings. Signed by Dr eHaven Fisher   Final Result   1. No acute cardiopulmonary finding.     Signed by Dr Malini Mcneal       [facility].    (Results Pending)           LABS:  Labs Reviewed   CBC WITH AUTO DIFFERENTIAL - Abnormal; Notable for the following components:       Result Value    RBC 3.85 (*)     Hemoglobin 11.4 (*)     MCHC 30.6 (*)     RDW 15.8 (*)     Platelets 232 (*)     MPV 9.3 (*)     Neutrophils % 67.3 (*)     All other components within normal limits   COMPREHENSIVE METABOLIC PANEL - Abnormal; Notable for the following components:    CO2 17 (*)     Glucose 149 (*)     All other components within normal limits   URINE RT REFLEX TO CULTURE - Abnormal; Notable for the following components:    Protein,  (*)     All other components within normal limits   MICROSCOPIC URINALYSIS - Abnormal; Notable for the following components:    Bacteria, UA NEGATIVE (*)     Crystals, UA NEG (*)     Hyaline Casts, UA 10 (*)     All other components within normal limits   COVID-19, RAPID   APTT   PROTIME-INR   TROPONIN   URINE DRUG SCREEN   LIPASE   ETHANOL   SALICYLATE LEVEL   ACETAMINOPHEN LEVEL       All other labs were within normal range or not returned as of this dictation. EMERGENCY DEPARTMENT COURSE and DIFFERENTIAL DIAGNOSIS/MDM:   Vitals:    Vitals:    07/31/21 1229 07/31/21 1400 07/31/21 1500   BP: 133/89 138/86 132/84   Pulse: 78 72 76   Resp: 18 18 18   Temp: 98.2 °F (36.8 °C)     SpO2: 95% 96% 95%   Weight: 150 lb (68 kg)     Height: 5' 4\" (1.626 m)         MDM  Number of Diagnoses or Management Options     Amount and/or Complexity of Data Reviewed  Clinical lab tests: ordered and reviewed  Tests in the radiology section of CPT®: ordered and reviewed  Discuss the patient with other providers: yes  Independent visualization of images, tracings, or specimens: yes      VSS, aox2 moving all extrem, unable to give much meaningful hx, confusion last several days, all imaging and labs reassuring overall, mild acidosis noted,  will need further work up and eval, d/w Dr. Joseph Burgess for admission        CONSULTS:  None    PROCEDURES:  Unless otherwise noted below, none     Procedures    FINAL IMPRESSION      1. Altered mental status, unspecified altered mental status type          DISPOSITION/PLAN   DISPOSITION Admitted 07/31/2021 02:12:47 PM      PATIENT REFERRED TO:  No follow-up provider specified.     DISCHARGE MEDICATIONS:  New Prescriptions    No medications on file          (Please note that portions of this note were completed with a voice recognition program.  Efforts were made to edit thedictations but occasionally words are mis-transcribed.)    Berenice Rush MD (electronically signed)  Attending Emergency Physician        Dorina Woodward MD  07/31/21 8405

## 2021-08-01 LAB
ALBUMIN SERPL-MCNC: 3 G/DL (ref 3.5–5.2)
ALP BLD-CCNC: 61 U/L (ref 35–104)
ALT SERPL-CCNC: 9 U/L (ref 5–33)
ANION GAP SERPL CALCULATED.3IONS-SCNC: 9 MMOL/L (ref 7–19)
AST SERPL-CCNC: 10 U/L (ref 5–32)
BASOPHILS ABSOLUTE: 0 K/UL (ref 0–0.2)
BASOPHILS RELATIVE PERCENT: 0.5 % (ref 0–1)
BILIRUB SERPL-MCNC: <0.2 MG/DL (ref 0.2–1.2)
BUN BLDV-MCNC: 15 MG/DL (ref 8–23)
C-REACTIVE PROTEIN: <0.3 MG/DL (ref 0–0.5)
CALCIUM SERPL-MCNC: 6.9 MG/DL (ref 8.8–10.2)
CHLORIDE BLD-SCNC: 116 MMOL/L (ref 98–111)
CHOLESTEROL, TOTAL: 123 MG/DL (ref 160–199)
CO2: 17 MMOL/L (ref 22–29)
CREAT SERPL-MCNC: 0.6 MG/DL (ref 0.5–0.9)
EOSINOPHILS ABSOLUTE: 0.1 K/UL (ref 0–0.6)
EOSINOPHILS RELATIVE PERCENT: 1.3 % (ref 0–5)
GFR AFRICAN AMERICAN: >59
GFR NON-AFRICAN AMERICAN: >60
GLUCOSE BLD-MCNC: 102 MG/DL (ref 70–99)
GLUCOSE BLD-MCNC: 114 MG/DL (ref 70–99)
GLUCOSE BLD-MCNC: 126 MG/DL (ref 70–99)
GLUCOSE BLD-MCNC: 132 MG/DL (ref 70–99)
GLUCOSE BLD-MCNC: 88 MG/DL (ref 74–109)
HBA1C MFR BLD: 5.1 % (ref 4–6)
HCT VFR BLD CALC: 25.2 % (ref 37–47)
HDLC SERPL-MCNC: 31 MG/DL (ref 65–121)
HEMOGLOBIN: 7.8 G/DL (ref 12–16)
IMMATURE GRANULOCYTES #: 0 K/UL
LDL CHOLESTEROL CALCULATED: 58 MG/DL
LV EF: 58 %
LVEF MODALITY: NORMAL
LYMPHOCYTES ABSOLUTE: 2.2 K/UL (ref 1.1–4.5)
LYMPHOCYTES RELATIVE PERCENT: 28.4 % (ref 20–40)
MAGNESIUM: 1.5 MG/DL (ref 1.6–2.4)
MCH RBC QN AUTO: 30.1 PG (ref 27–31)
MCHC RBC AUTO-ENTMCNC: 31 G/DL (ref 33–37)
MCV RBC AUTO: 97.3 FL (ref 81–99)
MONOCYTES ABSOLUTE: 0.5 K/UL (ref 0–0.9)
MONOCYTES RELATIVE PERCENT: 6.2 % (ref 0–10)
NEUTROPHILS ABSOLUTE: 4.8 K/UL (ref 1.5–7.5)
NEUTROPHILS RELATIVE PERCENT: 63.1 % (ref 50–65)
PDW BLD-RTO: 15.6 % (ref 11.5–14.5)
PERFORMED ON: ABNORMAL
PLATELET # BLD: 301 K/UL (ref 130–400)
PMV BLD AUTO: 9.3 FL (ref 9.4–12.3)
POTASSIUM SERPL-SCNC: 2.9 MMOL/L (ref 3.5–5)
RBC # BLD: 2.59 M/UL (ref 4.2–5.4)
SEDIMENTATION RATE, ERYTHROCYTE: 18 MM/HR (ref 0–25)
SODIUM BLD-SCNC: 142 MMOL/L (ref 136–145)
TOTAL PROTEIN: 5.1 G/DL (ref 6.6–8.7)
TRIGL SERPL-MCNC: 168 MG/DL (ref 0–149)
TSH SERPL DL<=0.05 MIU/L-ACNC: 0.84 UIU/ML (ref 0.27–4.2)
VITAMIN B-12: 316 PG/ML (ref 211–946)
WBC # BLD: 7.6 K/UL (ref 4.8–10.8)

## 2021-08-01 PROCEDURE — 86140 C-REACTIVE PROTEIN: CPT

## 2021-08-01 PROCEDURE — 2580000003 HC RX 258: Performed by: INTERNAL MEDICINE

## 2021-08-01 PROCEDURE — 36415 COLL VENOUS BLD VENIPUNCTURE: CPT

## 2021-08-01 PROCEDURE — 2500000003 HC RX 250 WO HCPCS: Performed by: INTERNAL MEDICINE

## 2021-08-01 PROCEDURE — 96376 TX/PRO/DX INJ SAME DRUG ADON: CPT

## 2021-08-01 PROCEDURE — 80061 LIPID PANEL: CPT

## 2021-08-01 PROCEDURE — 83036 HEMOGLOBIN GLYCOSYLATED A1C: CPT

## 2021-08-01 PROCEDURE — G0378 HOSPITAL OBSERVATION PER HR: HCPCS

## 2021-08-01 PROCEDURE — 82607 VITAMIN B-12: CPT

## 2021-08-01 PROCEDURE — 99223 1ST HOSP IP/OBS HIGH 75: CPT | Performed by: PSYCHIATRY & NEUROLOGY

## 2021-08-01 PROCEDURE — 84443 ASSAY THYROID STIM HORMONE: CPT

## 2021-08-01 PROCEDURE — 6370000000 HC RX 637 (ALT 250 FOR IP): Performed by: INTERNAL MEDICINE

## 2021-08-01 PROCEDURE — 85025 COMPLETE CBC W/AUTO DIFF WBC: CPT

## 2021-08-01 PROCEDURE — 85652 RBC SED RATE AUTOMATED: CPT

## 2021-08-01 PROCEDURE — 1210000000 HC MED SURG R&B

## 2021-08-01 PROCEDURE — 96365 THER/PROPH/DIAG IV INF INIT: CPT

## 2021-08-01 PROCEDURE — 6360000002 HC RX W HCPCS: Performed by: INTERNAL MEDICINE

## 2021-08-01 PROCEDURE — 82947 ASSAY GLUCOSE BLOOD QUANT: CPT

## 2021-08-01 PROCEDURE — 83735 ASSAY OF MAGNESIUM: CPT

## 2021-08-01 PROCEDURE — 80053 COMPREHEN METABOLIC PANEL: CPT

## 2021-08-01 PROCEDURE — 93306 TTE W/DOPPLER COMPLETE: CPT

## 2021-08-01 PROCEDURE — C9113 INJ PANTOPRAZOLE SODIUM, VIA: HCPCS | Performed by: INTERNAL MEDICINE

## 2021-08-01 PROCEDURE — 96372 THER/PROPH/DIAG INJ SC/IM: CPT

## 2021-08-01 RX ORDER — CALCIUM GLUCONATE 20 MG/ML
1000 INJECTION, SOLUTION INTRAVENOUS ONCE
Status: COMPLETED | OUTPATIENT
Start: 2021-08-01 | End: 2021-08-01

## 2021-08-01 RX ORDER — SODIUM CHLORIDE 9 MG/ML
10 INJECTION INTRAVENOUS 2 TIMES DAILY
Status: DISCONTINUED | OUTPATIENT
Start: 2021-08-01 | End: 2021-08-02 | Stop reason: HOSPADM

## 2021-08-01 RX ORDER — PANTOPRAZOLE SODIUM 40 MG/10ML
40 INJECTION, POWDER, LYOPHILIZED, FOR SOLUTION INTRAVENOUS 2 TIMES DAILY
Status: DISCONTINUED | OUTPATIENT
Start: 2021-08-01 | End: 2021-08-02 | Stop reason: HOSPADM

## 2021-08-01 RX ADMIN — SODIUM BICARBONATE 650 MG: 650 TABLET ORAL at 17:30

## 2021-08-01 RX ADMIN — CALCIUM GLUCONATE 1000 MG: 20 INJECTION, SOLUTION INTRAVENOUS at 15:36

## 2021-08-01 RX ADMIN — SODIUM BICARBONATE 650 MG: 650 TABLET ORAL at 09:19

## 2021-08-01 RX ADMIN — GABAPENTIN 600 MG: 600 TABLET, FILM COATED ORAL at 09:19

## 2021-08-01 RX ADMIN — SODIUM CHLORIDE: 9 INJECTION, SOLUTION INTRAVENOUS at 06:10

## 2021-08-01 RX ADMIN — SODIUM BICARBONATE 650 MG: 650 TABLET ORAL at 21:39

## 2021-08-01 RX ADMIN — SODIUM CHLORIDE: 9 INJECTION, SOLUTION INTRAVENOUS at 17:02

## 2021-08-01 RX ADMIN — PANTOPRAZOLE SODIUM 40 MG: 40 INJECTION, POWDER, FOR SOLUTION INTRAVENOUS at 13:52

## 2021-08-01 RX ADMIN — ATORVASTATIN CALCIUM 80 MG: 40 TABLET, FILM COATED ORAL at 21:39

## 2021-08-01 RX ADMIN — PANTOPRAZOLE SODIUM 40 MG: 40 TABLET, DELAYED RELEASE ORAL at 06:10

## 2021-08-01 RX ADMIN — SUCRALFATE 1 G: 1 TABLET ORAL at 17:30

## 2021-08-01 RX ADMIN — SODIUM CHLORIDE, PRESERVATIVE FREE 10 ML: 5 INJECTION INTRAVENOUS at 09:20

## 2021-08-01 RX ADMIN — SODIUM BICARBONATE 650 MG: 650 TABLET ORAL at 13:52

## 2021-08-01 RX ADMIN — SUCRALFATE 1 G: 1 TABLET ORAL at 21:39

## 2021-08-01 RX ADMIN — ENOXAPARIN SODIUM 40 MG: 40 INJECTION SUBCUTANEOUS at 09:19

## 2021-08-01 RX ADMIN — PANTOPRAZOLE SODIUM 40 MG: 40 INJECTION, POWDER, FOR SOLUTION INTRAVENOUS at 21:39

## 2021-08-01 RX ADMIN — GABAPENTIN 600 MG: 600 TABLET, FILM COATED ORAL at 21:39

## 2021-08-01 RX ADMIN — SUCRALFATE 1 G: 1 TABLET ORAL at 09:19

## 2021-08-01 RX ADMIN — SODIUM CHLORIDE, PRESERVATIVE FREE 10 ML: 5 INJECTION INTRAVENOUS at 21:39

## 2021-08-01 RX ADMIN — SUCRALFATE 1 G: 1 TABLET ORAL at 13:52

## 2021-08-01 RX ADMIN — ASPIRIN 81 MG: 81 TABLET, CHEWABLE ORAL at 09:19

## 2021-08-01 RX ADMIN — SODIUM CHLORIDE, PRESERVATIVE FREE 10 ML: 5 INJECTION INTRAVENOUS at 13:52

## 2021-08-01 ASSESSMENT — PAIN SCALES - GENERAL
PAINLEVEL_OUTOF10: 0
PAINLEVEL_OUTOF10: 0

## 2021-08-01 NOTE — PLAN OF CARE
Problem: Falls - Risk of:  Goal: Will remain free from falls  Description: Will remain free from falls  8/1/2021 0026 by Kathy Donohue RN  Outcome: Ongoing  8/1/2021 0024 by Kathy Donohue RN  Outcome: Ongoing  Goal: Absence of physical injury  Description: Absence of physical injury  8/1/2021 0026 by Kathy Donohue RN  Outcome: Ongoing  8/1/2021 0024 by Kathy Donohue RN  Outcome: Ongoing     Problem: Skin Integrity:  Goal: Will show no infection signs and symptoms  Description: Will show no infection signs and symptoms  8/1/2021 0026 by Kathy Donohue RN  Outcome: Ongoing  8/1/2021 0024 by Kathy Donohue RN  Outcome: Ongoing  Goal: Absence of new skin breakdown  Description: Absence of new skin breakdown  8/1/2021 0026 by Kathy Donohue RN  Outcome: Ongoing  8/1/2021 0024 by Kathy Donohue RN  Outcome: Ongoing

## 2021-08-01 NOTE — PROGRESS NOTES
[facility]        Hospitalist Progress Note  8/1/2021 9:59 AM  Subjective:   Admit Date: 7/31/2021  PCP: Luba Ely    Chief Complaint: AMS    Subjective: Patient seen and examined at bedside. AAOx3 (person, place, time) today. Denies chest pain or dyspnea. Still feels foggy/confused. [facility] Hospital History: 63 y.o. female with a past medical history of hypertension GERD who presented to the ED complaining of AMS that has been present for the last few days. MRI brain grossly unremarkable. Neurology consulted. ROS: 14 point review of systems is negative except as specifically addressed above. ADULT DIET;  Regular    Intake/Output Summary (Last 24 hours) at 8/1/2021 0959  Last data filed at 8/1/2021 0901  Gross per 24 hour   Intake 1219.96 ml   Output 100 ml   Net 1119.96 ml     Medications:   dextrose      sodium chloride      sodium chloride 100 mL/hr at 08/01/21 4225     Current Facility-Administered Medications   Medication Dose Route Frequency Provider Last Rate Last Admin    insulin lispro (HUMALOG) injection vial 0-6 Units  0-6 Units Subcutaneous TID WC Roopa Orellana MD        insulin lispro (HUMALOG) injection vial 0-3 Units  0-3 Units Subcutaneous Nightly Roopa Orellana MD        glucose (GLUTOSE) 40 % oral gel 15 g  15 g Oral PRN Roopa Orellana MD        dextrose 50 % IV solution  12.5 g Intravenous PRN Roopa Orellana MD        glucagon (rDNA) injection 1 mg  1 mg Intramuscular PRN Roopa Orellana MD        dextrose 5 % solution  100 mL/hr Intravenous PRN Roopa Orellana MD        sodium chloride flush 0.9 % injection 5-40 mL  5-40 mL Intravenous 2 times per day Roopa Orellana MD   10 mL at 08/01/21 0920    sodium chloride flush 0.9 % injection 5-40 mL  5-40 mL Intravenous PRN Roopa Orellana MD   10 mL at 07/31/21 2042    0.9 % sodium chloride infusion  25 mL Intravenous PRN Roopa Orellana MD        enoxaparin (LOVENOX) injection 40 mg  40 mg Subcutaneous Daily Roopa Orellana MD   40 mg at 08/01/21 0919    ondansetron (ZOFRAN-ODT) disintegrating tablet 4 mg  4 mg Oral Q8H PRN Kimberly Kapadia MD        Or    ondansetron [facility]) injection 4 mg  4 mg Intravenous Q6H PRN Kimberly Kapadia MD        polyethylene glycol [facility]) packet 17 g  17 g Oral Daily PRN Kimberly Kapadia MD        acetaminophen (TYLENOL) tablet 650 mg  650 mg Oral Q6H PRN Kimberly Kapadia MD        Or    acetaminophen (TYLENOL) suppository 650 mg  650 mg Rectal Q6H PRN Kimberly Kapadia MD        potassium chloride (KLOR-CON M) extended release tablet 40 mEq  40 mEq Oral PRN Kimberly Kapadia MD        Or    potassium bicarb-citric acid (EFFER-K) effervescent tablet 40 mEq  40 mEq Oral PRN Kimberly Kapadia MD        Or    potassium chloride 10 mEq/100 mL IVPB (Peripheral Line)  10 mEq Intravenous PRN Kimberly Kapadia MD        magnesium sulfate 2000 mg in 50 mL IVPB premix  2,000 mg Intravenous PRN Kimberly Kapadia MD        aspirin chewable tablet 81 mg  81 mg Oral Daily Kimberly Kapadia MD   81 mg at 08/01/21 0919    atorvastatin (LIPITOR) tablet 80 mg  80 mg Oral Nightly Kimberly Kapadia MD   80 mg at 07/31/21 2042    buprenorphine-naloxone (SUBOXONE) 8-2 MG SL film 1 Film  1 Film Sublingual TID Kimberly Kapadia MD        gabapentin (NEURONTIN) tablet 600 mg  600 mg Oral BID Kimberly Kapadia MD   600 mg at 08/01/21 0919    [Held by provider] lisinopril (PRINIVIL;ZESTRIL) tablet 10 mg  10 mg Oral Daily Kimberly Kapadia MD   10 mg at 07/31/21 2058    [Held by provider] metoprolol tartrate (LOPRESSOR) tablet 12.5 mg  12.5 mg Oral BID Kimberly Kapadia MD   12.5 mg at 07/31/21 2042    pantoprazole (PROTONIX) tablet 40 mg  40 mg Oral QAM AC Kimberly Kapadia MD   40 mg at 08/01/21 0610    sucralfate (CARAFATE) tablet 1 g  1 g Oral 4x Daily Kimberly Kapadia MD   1 g at 08/01/21 0919    sodium bicarbonate tablet 650 mg  650 mg Oral 4x Daily Kimberly Kapadia MD   650 mg at 08/01/21 0919    0.9 % sodium chloride infusion   Intravenous Continuous Kimberly Kapadia  mL/hr at 08/01/21 0610 New Bag at 08/01/21 0610        Labs:     Recent Labs     07/31/21  1240   WBC 10.7   RBC 3.85*   HGB 11.4*   HCT 37.2   MCV 96.6   MCH 29.6   MCHC 30.6*   *     Recent Labs     07/31/21  1240      K 3.8   ANIONGAP 16      CO2 17*   BUN 17   CREATININE 0.7   GLUCOSE 149*   CALCIUM 9.9     No results for input(s): MG, PHOS in the last 72 hours. Recent Labs     07/31/21  1240   AST 17   ALT 15   BILITOT <0.2   ALKPHOS 104     ABGs:No results for input(s): PH, PO2, PCO2, HCO3, BE, O2SAT in the last 72 hours. Troponin T:   Recent Labs     07/31/21  1240   TROPONINI 0.02     INR:   Recent Labs     07/31/21  1240   INR 0.98     Lactic Acid: No results for input(s): LACTA in the last 72 hours. Objective:   Vitals: BP (!) 82/55   Pulse 56   Temp 97.2 °F (36.2 °C) (Temporal)   Resp 16   Ht 5' 4\" (1.626 m)   Wt 150 lb 6.4 oz (68.2 kg)   SpO2 97%   BMI 25.82 kg/m²   24HR INTAKE/OUTPUT:      Intake/Output Summary (Last 24 hours) at 8/1/2021 0959  Last data filed at 8/1/2021 0901  Gross per 24 hour   Intake 1219.96 ml   Output 100 ml   Net 1119.96 ml     General appearance: Alert  Head: NC/AT  Eyes: conjunctivae/corneas clear   Ears: normal external ears  Neck: Supple  Lungs: BLAE, no wheezing appreciated   Heart: RRR, no murmurs appreciated  Abdomen: BS+, soft, NT, ND  Extremities: no edema, pulses+  Skin: warm  Neurologic: AAOx3 (person, place, time), gross motor function intact  Psychiatric:  dismissive, surly    Assessment and Plan: Active Problems:    AMS (altered mental status)  Resolved Problems:    * No resolved hospital problems. *    AMS: Unknown etiology - AAOx3 (person, place, time). MRI brain grossly unremarkable. EEG pending. Neurology consultation. PT/OT/SLP. Fall/seizure precautions.     Hypertension: Monitor BP and use medications as needed.  Currently on hold.     GERD: PPI.     Chronic pain: On Suboxone - needs to be verified.     Supportive management.     Advance Directive: Full Code    DVT prophylaxis: Lovenox    Discharge planning: TBD      Signed:  Beverly Davis MD 8/1/2021 9:59 AM  Rounding Hospitalist

## 2021-08-01 NOTE — CONSULTS
[facility] Neurology Consult  ? ? Patient: Barrett Johnson  MR#: [id number]  Account Number: [de-identified]   Room: 0309/309-01   YOB: 1960  Date of Progress Note: 8/1/2021  Time of Note 10:05 AM  Attending Physician: Robles Nguyễn MD  Consulting Physician: Amina Herndon M.D.  ?  ? CHIEF COMPLAINT: Altered mental status  ? HISTORY OF PRESENT ILLNESS:   This 63 y.o. female who presents with altered mental status. She says she has had trouble using the phone in the remote control and told her sister there was something wrong and he brought her to the ED. So far she has had a normal MRI of the head, urinalysis, routine blood studies and a chest x-ray. Patient says her problem has been going on for perhaps a couple of weeks. She has a history of addiction to Percocet and took Suboxone but denies having been on either 1 of those for several months. Denies any unusual stressors. Otherwise is a relatively unremarkable past medical history except for GERD and hypertension. She is convinced that something bad is wrong with her. Says she has had a head injury in the past and has a history of fibromyalgia and possibly seizures. REVIEW OF SYSTEMS:  Constitutional - No fever or chills. No diaphoresis or significant fatigue. HENT - No tinnitus or significant hearing loss. Eyes - no sudden vision change or eye pain  Respiratory - no significant shortness of breath or cough  Cardiovascular - no chest pain No palpitations or significant leg swelling  Gastrointestinal - no abdominal swelling or pain. Genitourinary - No difficulty urinating, dysuria  Musculoskeletal - no back pain or myalgia. Skin - no color change or rash  Neurologic - No seizures. No lateralizing weakness. Hematologic - no easy bruising or excessive bleeding. Psychiatric - no severe anxiety or nervousness. All other review of systems are negative. ?   Past Medical History:      Diagnosis Date    Arthritis     Fracture     Gastric ulcer     40 mEq, Oral, PRN **OR** potassium bicarb-citric acid (EFFER-K) effervescent tablet 40 mEq, 40 mEq, Oral, PRN **OR** potassium chloride 10 mEq/100 mL IVPB (Peripheral Line), 10 mEq, Intravenous, PRN, Morena Bhat MD    magnesium sulfate 2000 mg in 50 mL IVPB premix, 2,000 mg, Intravenous, PRN, Morena Bhat MD    aspirin chewable tablet 81 mg, 81 mg, Oral, Daily, Morena Bhat MD, 81 mg at 08/01/21 0919    atorvastatin (LIPITOR) tablet 80 mg, 80 mg, Oral, Nightly, Morena Bhat MD, 80 mg at 07/31/21 2042    gabapentin (NEURONTIN) tablet 600 mg, 600 mg, Oral, BID, Morena Bhat MD, 600 mg at 08/01/21 0919    [Held by provider] lisinopril (PRINIVIL;ZESTRIL) tablet 10 mg, 10 mg, Oral, Daily, Morena Bhat MD, 10 mg at 07/31/21 2058    [Held by provider] metoprolol tartrate (LOPRESSOR) tablet 12.5 mg, 12.5 mg, Oral, BID, Morena Bhat MD, 12.5 mg at 07/31/21 2042    pantoprazole (PROTONIX) tablet 40 mg, 40 mg, Oral, QAM AC, Morena Bhat MD, 40 mg at 08/01/21 0610    sucralfate (CARAFATE) tablet 1 g, 1 g, Oral, 4x Daily, Morena Bhat MD, 1 g at 08/01/21 0919    sodium bicarbonate tablet 650 mg, 650 mg, Oral, 4x Daily, Morena Bhat MD, 650 mg at 08/01/21 0919    0.9 % sodium chloride infusion, , Intravenous, Continuous, Morena Bhat MD, Last Rate: 100 mL/hr at 08/01/21 0610, New Bag at 08/01/21 0610  Allergies: Benadryl [diphenhydramine] and Phenergan [promethazine hcl]  Social History:   TOBACCO:  reports that she has never smoked. She has never used smokeless tobacco.  ETOH:  reports current alcohol use. Family History:       Problem Relation Age of Onset    Heart Attack Mother     Diabetes Mother     Heart Disease Father     Heart Failure Father     Diabetes Father      ? ?   Physical Exam:    Vitals: BP (!) 82/55   Pulse 56   Temp 97.2 °F (36.2 °C) (Temporal)   Resp 16   Ht 5' 4\" (1.626 m)   Wt 150 lb 6.4 oz (68.2 kg)   SpO2 97%   BMI 25.82 kg/m²   Constitutional - well developed, well nourished. Eyes - conjunctiva normal. Pupils react to light  Ear, nose, throat -hearing intact to finger rub No scars, masses, or lesions over external nose or ears, no atrophy of tongue  Neck-symmetric, no masses noted, no jugular vein distension  Respiration- chest wall appears symmetric, good expansion,   normal effort without use of accessory muscles  Musculoskeletal - no significant wasting of muscles noted, no bony deformities  Extremities-no clubbing, cyanosis or edema  Skin - warm, dry, and intact. No rash, erythema, or pallor. Psychiatric - mood, affect, and behavior appear normal.   Neurological exam  Awake, alert, fluent oriented x 3 appropriate affect  Attention and concentration appear appropriate  Recent and remote memory appears unremarkable  Speech normal without dysarthria  No clear issues with language of fund of knowledge  Cranial Nerve Exam   CN II- Visual fields grossly unremarkable  CN III, IV,VI-EOMI, No nystagmus, conjugate eye movements, no ptosis  CN V-sensation intact to LT over face  CN VII-no facial assymetry  CN VIII-Hearing intact to voice  CN IX and X- Palate elevates in midline  CN XI-good shoulder shrug  CN XII-Tongue midline with no fasciculations or fibrillations  Motor Exam  V/V throughout upper and lower extremities bilaterally, no cogwheeling, normal tone  Sensory Exam  Sensation intact to light touch and temperature upper and lower extremities bilaterally  Reflexes   2+ biceps bilaterally  2+ brachioradialis  2+patella  2+ ankle jerks  No clonus ankles  No Paredes's sign bilateral hands  Tremors- no tremors in hands or head noted  Gait  Not tested  Coordination  Finger to nose-unremarkable  ?   ?  CBC:   Recent Labs     07/31/21  1240   WBC 10.7   HGB 11.4*   *     BMP:   Recent Labs     07/31/21  1240      K 3.8      CO2 17*   BUN 17   CREATININE 0.7   GLUCOSE 149*     Hepatic:   Recent Labs     07/31/21  1240   AST 17   ALT 15   BILITOT <0.2   ALKPHOS 104     Lipids: No results for input(s): CHOL, HDL in the last 72 hours. Invalid input(s): LDLCALCU  INR:   Recent Labs     07/31/21  1240   INR 0.98     ?  ? Assessment and Plan   1. Altered mental status/confusion. Currently has a nonfocal neurological examination. MRI and routine blood studies including urinalysis unremarkable. Check sed rate, CRP, TSH and B12 along with EEG. Will follow with you. If all is okay okay to discharge home tomorrow. Discussed with primary care physician.   ?  ?      Magy Oneal MD  Board Certified in Neurology

## 2021-08-01 NOTE — PROCEDURES
[facility]                 [address] [address]                          ELECTROENCEPHALOGRAM REPORT    PATIENT NAME: Mitchell Jett                        :        1960  MED REC NO:   [id number]                              ROOM:       [facility]  ACCOUNT NO:   [de-identified]                           ADMIT DATE: 2021  PROVIDER:     Abraham Mir MD    DATE OF EE2021  Indication for test: Memory loss  SUMMARY:  The patient appeared to be asleep throughout the entire  tracing. The background rhythm is 5 to 6 Hz at times, slower and  typically high in amplitude. There does not appear to have been an  attempt to awaken the patient. Photic stimulation produced no  abnormalities. No clear epileptiform discharges nor any focal or  lateralizing slowing is noted. IMPRESSION:  Normal sleep appearing EEG. Consider followup study if  clinically indicated.         Elyssa Swartz MD    D: 2021 12:14:52      T: 2021 13:03:10     [id number]  Job#: [id number]     Doc#: [id number]    CC:

## 2021-08-02 VITALS
WEIGHT: 156.25 LBS | OXYGEN SATURATION: 99 % | DIASTOLIC BLOOD PRESSURE: 68 MMHG | BODY MASS INDEX: 26.67 KG/M2 | RESPIRATION RATE: 16 BRPM | HEIGHT: 64 IN | TEMPERATURE: 97.7 F | HEART RATE: 85 BPM | SYSTOLIC BLOOD PRESSURE: 120 MMHG

## 2021-08-02 LAB
ALBUMIN SERPL-MCNC: 3.4 G/DL (ref 3.5–5.2)
ALP BLD-CCNC: 63 U/L (ref 35–104)
ALT SERPL-CCNC: 9 U/L (ref 5–33)
ANION GAP SERPL CALCULATED.3IONS-SCNC: 10 MMOL/L (ref 7–19)
AST SERPL-CCNC: 10 U/L (ref 5–32)
BASOPHILS ABSOLUTE: 0.1 K/UL (ref 0–0.2)
BASOPHILS RELATIVE PERCENT: 0.9 % (ref 0–1)
BILIRUB SERPL-MCNC: <0.2 MG/DL (ref 0.2–1.2)
BUN BLDV-MCNC: 15 MG/DL (ref 8–23)
CALCIUM SERPL-MCNC: 8.7 MG/DL (ref 8.8–10.2)
CHLORIDE BLD-SCNC: 114 MMOL/L (ref 98–111)
CO2: 18 MMOL/L (ref 22–29)
CREAT SERPL-MCNC: 0.6 MG/DL (ref 0.5–0.9)
EOSINOPHILS ABSOLUTE: 0.2 K/UL (ref 0–0.6)
EOSINOPHILS RELATIVE PERCENT: 2.8 % (ref 0–5)
GFR AFRICAN AMERICAN: >59
GFR NON-AFRICAN AMERICAN: >60
GLUCOSE BLD-MCNC: 105 MG/DL (ref 74–109)
GLUCOSE BLD-MCNC: 120 MG/DL (ref 70–99)
HCT VFR BLD CALC: 30.9 % (ref 37–47)
HEMOGLOBIN: 9.2 G/DL (ref 12–16)
IMMATURE GRANULOCYTES #: 0 K/UL
LYMPHOCYTES ABSOLUTE: 3.6 K/UL (ref 1.1–4.5)
LYMPHOCYTES RELATIVE PERCENT: 42.1 % (ref 20–40)
MAGNESIUM: 1.8 MG/DL (ref 1.6–2.4)
MCH RBC QN AUTO: 29.4 PG (ref 27–31)
MCHC RBC AUTO-ENTMCNC: 29.8 G/DL (ref 33–37)
MCV RBC AUTO: 98.7 FL (ref 81–99)
MONOCYTES ABSOLUTE: 0.6 K/UL (ref 0–0.9)
MONOCYTES RELATIVE PERCENT: 7.6 % (ref 0–10)
NEUTROPHILS ABSOLUTE: 3.9 K/UL (ref 1.5–7.5)
NEUTROPHILS RELATIVE PERCENT: 46.2 % (ref 50–65)
PDW BLD-RTO: 15.7 % (ref 11.5–14.5)
PERFORMED ON: ABNORMAL
PLATELET # BLD: 348 K/UL (ref 130–400)
PMV BLD AUTO: 9.6 FL (ref 9.4–12.3)
POTASSIUM SERPL-SCNC: 3.5 MMOL/L (ref 3.5–5)
RBC # BLD: 3.13 M/UL (ref 4.2–5.4)
SODIUM BLD-SCNC: 142 MMOL/L (ref 136–145)
TOTAL PROTEIN: 5.7 G/DL (ref 6.6–8.7)
WBC # BLD: 8.5 K/UL (ref 4.8–10.8)

## 2021-08-02 PROCEDURE — 82947 ASSAY GLUCOSE BLOOD QUANT: CPT

## 2021-08-02 PROCEDURE — 6370000000 HC RX 637 (ALT 250 FOR IP): Performed by: INTERNAL MEDICINE

## 2021-08-02 PROCEDURE — G0378 HOSPITAL OBSERVATION PER HR: HCPCS

## 2021-08-02 PROCEDURE — C9113 INJ PANTOPRAZOLE SODIUM, VIA: HCPCS | Performed by: INTERNAL MEDICINE

## 2021-08-02 PROCEDURE — 99233 SBSQ HOSP IP/OBS HIGH 50: CPT | Performed by: PSYCHIATRY & NEUROLOGY

## 2021-08-02 PROCEDURE — 80053 COMPREHEN METABOLIC PANEL: CPT

## 2021-08-02 PROCEDURE — 97161 PT EVAL LOW COMPLEX 20 MIN: CPT

## 2021-08-02 PROCEDURE — 6360000002 HC RX W HCPCS: Performed by: INTERNAL MEDICINE

## 2021-08-02 PROCEDURE — 36415 COLL VENOUS BLD VENIPUNCTURE: CPT

## 2021-08-02 PROCEDURE — 96376 TX/PRO/DX INJ SAME DRUG ADON: CPT

## 2021-08-02 PROCEDURE — 83735 ASSAY OF MAGNESIUM: CPT

## 2021-08-02 PROCEDURE — 85025 COMPLETE CBC W/AUTO DIFF WBC: CPT

## 2021-08-02 PROCEDURE — 2580000003 HC RX 258: Performed by: INTERNAL MEDICINE

## 2021-08-02 RX ORDER — SODIUM BICARBONATE 650 MG/1
650 TABLET ORAL 3 TIMES DAILY
Qty: 21 TABLET | Refills: 0 | Status: SHIPPED | OUTPATIENT
Start: 2021-08-02 | End: 2021-08-09

## 2021-08-02 RX ADMIN — SODIUM BICARBONATE 650 MG: 650 TABLET ORAL at 08:57

## 2021-08-02 RX ADMIN — SUCRALFATE 1 G: 1 TABLET ORAL at 12:39

## 2021-08-02 RX ADMIN — PANTOPRAZOLE SODIUM 40 MG: 40 INJECTION, POWDER, FOR SOLUTION INTRAVENOUS at 08:57

## 2021-08-02 RX ADMIN — SODIUM CHLORIDE, PRESERVATIVE FREE 10 ML: 5 INJECTION INTRAVENOUS at 08:59

## 2021-08-02 RX ADMIN — GABAPENTIN 600 MG: 600 TABLET, FILM COATED ORAL at 08:57

## 2021-08-02 RX ADMIN — SUCRALFATE 1 G: 1 TABLET ORAL at 08:57

## 2021-08-02 RX ADMIN — SODIUM BICARBONATE 650 MG: 650 TABLET ORAL at 12:39

## 2021-08-02 RX ADMIN — SODIUM CHLORIDE: 9 INJECTION, SOLUTION INTRAVENOUS at 03:01

## 2021-08-02 RX ADMIN — ACETAMINOPHEN 650 MG: 325 TABLET ORAL at 10:56

## 2021-08-02 ASSESSMENT — PAIN SCALES - GENERAL: PAINLEVEL_OUTOF10: 8

## 2021-08-02 NOTE — PROGRESS NOTES
Patient:   Luzma Vargas  MR#:    [id number]   Room:    0309/309-01   YOB: 1960  Date of Progress Note: 8/2/2021  Time of Note                           9:22 AM  Consulting Physician:   Alon Olivas M.D. Attending Physician:  Venessa Elmore MD     CHIEF COMPLAINT: Altered mental status  ? Subjective  This 63 y.o. female who presents with altered mental status. She says she has had trouble using the phone in the remote control and told her sister there was something wrong and he brought her to the ED. So far she has had a normal MRI of the head, urinalysis, routine blood studies and a chest x-ray. Patient says her problem has been going on for perhaps a couple of weeks. She has a history of addiction to Percocet and took Suboxone but denies having been on either 1 of those for several months. Denies any unusual stressors. Otherwise is a relatively unremarkable past medical history except for GERD and hypertension. She is convinced that something bad is wrong with her. Says she has had a head injury in the past and has a history of fibromyalgia and possibly seizures. No problems overnight. Says she still has a little trouble using the phone at times  REVIEW OF SYSTEMS:  Constitutional: No fevers No chills  Neck:No stiffness  Respiratory: No shortness of breath  Cardiovascular: No chest pain No palpitations  Gastrointestinal: No abdominal pain    Genitourinary: No Dysuria  Neurological: No headache, no confusion      PHYSICAL EXAM:  BP (!) 100/54   Pulse 61   Temp 97 °F (36.1 °C) (Temporal)   Resp 14   Ht 5' 4\" (1.626 m)   Wt 156 lb 4 oz (70.9 kg)   SpO2 99%   BMI 26.82 kg/m²     Constitutional: she appears well-developed and well-nourished. Eyes - conjunctiva normal.  Pupils react to light  Ear, nose, throat -hearing intact to voice.  No scars, masses, or lesions over external nose or ears, no atrophy of tongue  Neck-symmetric, no masses noted, no jugular vein distension  Respiration- chest wall appears symmetric, good expansion,   normal effort without use of accessory muscles  Cardiovascular- RRR  Musculoskeletal - no significant wasting of muscles noted, no bony deformities, gait no gross ataxia  Extremities-no clubbing, cyanosis or edema  Skin - warm, dry, and intact. No rash, erythema, or pallor. Psychiatric - mood, affect, and behavior appear normal.      Neurology  NEUROLOGICAL EXAM:      Mental status   Awake, alert, fluent oriented x 3 appropriate affect  Attention and concentration appear appropriate  Recent and remote memory appears unremarkable  Speech normal without dysarthria  No clear issues with language       Cranial Nerves   CN II- Visual fields grossly unremarkable  CN III, IV,VI-EOMI, No nystagmus, conjugate eye movements, no ptosis  CN VII-no facial asymmetry  CN VIII-Hearing intact   CN IX and X- Palate elevates in midline  CN XI-good shoulder shrug  CN XII-Tongue midline with no fasciculations or fibrillations          Motor function  Antigravity x 4     Sensory function Intact to light touch     Cerebellar F-N intact     Tremor None present     Gait                  Not tested           Nursing/pcp notes, imaging,labs and vitals reviewed.      PT,OT and/or speech notes reviewed    Lab Results   Component Value Date    WBC 8.5 08/02/2021    HGB 9.2 (L) 08/02/2021    HCT 30.9 (L) 08/02/2021    MCV 98.7 08/02/2021     08/02/2021     Lab Results   Component Value Date     08/02/2021    K 3.5 08/02/2021     (H) 08/02/2021    CO2 18 (L) 08/02/2021    BUN 15 08/02/2021    CREATININE 0.6 08/02/2021    GLUCOSE 105 08/02/2021    CALCIUM 8.7 (L) 08/02/2021    PROT 5.7 (L) 08/02/2021    LABALBU 3.4 (L) 08/02/2021    BILITOT <0.2 08/02/2021    ALKPHOS 63 08/02/2021    AST 10 08/02/2021    ALT 9 08/02/2021    LABGLOM >60 08/02/2021    GFRAA >59 08/02/2021     Lab Results   Component Value Date    INR 0.98 07/31/2021    INR 1.08 08/28/2019    INR 0.98 08/14/2015 Lab Results   Component Value Date    CRP <0.30 2021     DATE OF EE2021     SUMMARY:  The patient appeared to be asleep throughout the entire  tracing. The background rhythm is 5 to 6 Hz at times, slower and  typically high in amplitude. There does not appear to have been an  attempt to awaken the patient. Photic stimulation produced no  abnormalities. No clear epileptiform discharges nor any focal or  lateralizing slowing is noted.     IMPRESSION:  Normal sleep appearing EEG. Consider followup study if  clinically indicated.           RECORD REVIEW: Previous medical records, medications were reviewed at today's visit    IMPRESSION:   Altered mental status/confusion. Currently has a nonfocal neurological examination. MRI and routine blood studies including urinalysis unremarkable. Unremarkable sed rate, CRP, TSH and B12 , EEG. Suspect symptoms are more stress related. Extended discussion with patient regarding all of this. More than 35 minutes were spent reviewing the patient's records, examination and and counseling and coordination of care. No further neurological recommendations. Discharge okay with me. Follow-up as needed.

## 2021-08-02 NOTE — PROGRESS NOTES
Physical Therapy    Facility/Department: [facility] 3 [facility]SON/VAS/MED  Initial Assessment    NAME: Ashu Marroquin  : 1960  MRN: [id number]    Date of Service: 2021    Discharge Recommendations:   pt plans to d/c on  back home w/ sister        Assessment   Assessment: eval only  Prognosis: Excellent  Decision Making: Low Complexity  PT Education: Gait Training;General Safety; Functional Mobility Training;PT Role; Injury Prevention  REQUIRES PT FOLLOW UP: No  Activity Tolerance  Activity Tolerance: Patient Tolerated treatment well       Patient Diagnosis(es): The encounter diagnosis was Altered mental status, unspecified altered mental status type. has a past medical history of Arthritis, Fracture, Gastric ulcer, Hypertension, and Neuropathy. has a past surgical history that includes Hysterectomy;  section; Cholecystectomy; pr egd transoral biopsy single/multiple (N/A, 10/26/2018); and Shoulder arthroscopy (Right, 2019).     Restrictions     Vision/Hearing        Subjective  General  Family / Caregiver Present: Yes Rosa Fuller)  Diagnosis: AMS  Follows Commands: Within Functional Limits  Subjective  Subjective: pt willing to work with physical therapy  Pain Screening  Patient Currently in Pain: Denies  Vital Signs  Patient Currently in Pain: Denies  Pre Treatment Pain Screening  Intervention List: Patient able to continue with treatment    Orientation     Social/Functional History  Social/Functional History  Lives With:  (sister)  Type of Home: House  Home Layout: Two level  Home Access: Stairs to enter with rails  Entrance Stairs - Number of Steps: 3-5  Bathroom Shower/Tub: Tub/Shower unit  ADL Assistance: Independent  Homemaking Assistance: Independent  Ambulation Assistance: Independent  Transfer Assistance: Independent  Active : Yes  Cognition        Objective          AROM RLE (degrees)  RLE AROM: WFL  AROM LLE (degrees)  LLE AROM : WFL  Strength RLE  Strength RLE: WFL  Strength LLE  Strength LLE:

## 2021-08-02 NOTE — HOME CARE
Spoke to patient regarding home care orders. Patient refused stating she lives with her sister and she has all the help she needs.    Electronically signed by Clarissa Guillaume RN on 8/2/2021 at 1:00 PM

## 2021-08-02 NOTE — DISCHARGE SUMMARY
within normal limits. No evidence of significant pericardial effusion is noted. Normal size left atrium. bubble study positive with valsalva   Signature   ----------------------------------------------------------------  Electronically signed by Mabel Chin MD(Interpreting  physician) on 08/01/2021 12:05 PM  ----------------------------------------------------------------   Findings   Mitral Valve  Structurally normal mitral valve with normal leaflet mobility. No evidence  of mitral valve stenosis or mild mitral regurgitation. Aortic Valve  Aortic valve appears to be tricuspid. Structurally normal aortic valve. No significant aortic regurgitation or stenosis is noted. Tricuspid Valve  Tricuspid valve is structurally normal.  mild tr. pasp normal.   Left Atrium  Normal size left atrium. bubble study positive with valsalva   Left Ventricle  Normal left ventricular size with preserved LV function and an estimated  ejection fraction of approximately 55-60%. No evidence of left ventricular mass or thrombus noted. Right Atrium  Normal right atrial dimension with no evidence of thrombus or mass noted. Right Ventricle  Normal right ventricular size with preserved RV function. Pericardial Effusion  No evidence of significant pericardial effusion is noted. Miscellaneous  Aortic root is within normal limits.   M-Mode Measurements (cm)   LVIDd: 4.15 cm                       LVIDs: 2.59 cm  IVSd: 0.75 cm  LVPWd: 0.65 cm                       AO Root Dimension: 2.3 cm  % Ejection Fraction: 60 %            LA: 3.5 cm                                       LVOT: 2 cm  Doppler Measurements:   AV Peak Velocity:175 cm/s           MV Peak E-Wave: 73.3 cm/s  AV Peak Gradient: 12.25 mmHg        MV Peak A-Wave: 87.5 cm/s                                      MV E/A Ratio: 0.84 %  TR Velocity:256 cm/s                MV Peak Gradient: 2.15 mmHg  TR Gradient:26.21 mmHg  Estimated RAP:5 mmHg  RVSP:31 mmHg      CT HEAD WO CONTRAST    Result Date: 7/31/2021  EXAM: CT HEAD WO CONTRAST -- 7/31/2021 1:45 PM HISTORY: 60 years, Female, confusion, altered mental status COMPARISON: 1/24/2016 DLP: 803 mGy cm. Automated exposure control was utilized to minimize patient radiation dose. TECHNIQUE: Unenhanced axial CT images obtained from vertex to skull base with coronal and sagittal reformats. FINDINGS: No acute intracranial hemorrhage, midline shift, mass effect or large territory cortical infarct. Gray-white matter differentiation maintained. Ventricles, sulci, basilar cisterns are normal in size and configuration for the patient's age. Globes, retrobulbar soft tissues, paranasal sinuses, mastoid air cells and external auditory canals are within normal limits. Calvarium appears intact. Subcutaneous tissues within normal limits. 1. No acute intracranial findings. Signed by Dr Richar Ramirez Additional Contrast? None    Result Date: 7/31/2021  EXAM: CT ABDOMEN PELVIS W IV CONTRAST -- 7/31/2021 1:35 PM HISTORY: 60 years, Female, abdominal pain COMPARISON: 8/15/2015 DLP: 1329 mGy cm. Automated exposure control was utilized to minimize patient radiation dose. TECHNIQUE: Enhanced CT images obtained of the abdomen and pelvis with multiplanar reformats. FINDINGS: LUNG BASES: No acute findings of the lung bases. No inferior pericardial or pleural effusion. LIVER: The liver is normal in size and contour. Patent portal and hepatic veins. GALLBLADDER and BILARY: The gallbladder is absent. No intra-or extrahepatic biliary ductal dilatation. PANCREAS: Normal. SPLEEN: Normal size. Similar splenic contour compared to prior. ADRENALS: No adrenal mass. URINARY: No hydronephrosis, calcified nephrolithiasis or solid renal mass. Urinary bladder is underdistended, which limits evaluation. Uterus absent. Adnexa is not discretely identified. PERITONEUM: No ascites/free fluid. No pneumoperitoneum. BOWEL: Tiny hiatal hernia.  2 cm diverticulum at the proximal duodenum. Moderate amount of colonic stool. No evidence of bowel obstruction. Normal appendix on axial images 73-57. No abnormal bowel wall thickening. RETROPERITONEUM:  Aorta normal in course and caliber. Main branch vessels patent. Bilateral renal veins patent. No abdominal or pelvic adenopathy. ABDOMINAL WALL: Normal. OSSEOUS: No acute or destructive osseous lesions. Degenerative changes of the spine. 1. No acute intra-abdominal finding. 2. Moderate amount of left-sided and sigmoid colon stool. Signed by Dr Darius Minor    XR CHEST PORTABLE    Result Date: 7/31/2021  EXAM: XR CHEST PORTABLE -- 7/31/2021 1:09 PM HISTORY: 60 years, Female, altered mental status, fall COMPARISON: 8/28/2019 TECHNIQUE:  1 images. Frontal view of the chest. FINDINGS:  No pneumothorax, pleural effusion or focal consolidation. Normal cardiac and mediastinal contours. No acute findings of the bones, soft tissues or upper abdomen. 1. No acute cardiopulmonary finding. Signed by Dr Darius Minor    MRI BRAIN WO CONTRAST    Result Date: 7/31/2021  EXAM: MRI BRAIN WO CONTRAST -- 7/31/2021 4:06 PM HISTORY: 63 years, Female, confusion, fall, altered mental status COMPARISON: CT head 7/31/2021 TECHNIQUE: Routine pulse sequences of the brain were obtained without IV contrast. FINDINGS: No acute intracranial hemorrhage, midline shift, mass effect, or restricted diffusion to indicate acute infarct. Ventricles, cisterns and sulci are normal in size and configuration. Sella and midline structures within normal limits. Brainstem and posterior fossa are within normal limits. Visualized flow voids within normal limits. Visualized portions of the orbits, paranasal sinuses and mastoid air cells are within normal limits. Visualized overlying soft tissues within normal limits. 1. No acute or suspicious intracranial finding.  Signed by Dr Darius Minor      Pertinent Labs:   CBC:   Recent Labs     07/31/21  1240 08/01/21  1245 08/02/21  0241   WBC 10.7 7.6 8.5   HGB 11.4* 7.8* 9.2*   * 301 348     BMP:    Recent Labs     07/31/21  1240 08/01/21  1245 08/02/21  0241    142 142   K 3.8 2.9* 3.5    116* 114*   CO2 17* 17* 18*   BUN 17 15 15   CREATININE 0.7 0.6 0.6   GLUCOSE 149* 88 105     INR:   Recent Labs     07/31/21  1240   INR 0.98     ABGs:No results for input(s): PH, PO2, PCO2, HCO3, BE, O2SAT in the last 72 hours. Lactic Acid:No results for input(s): LACTA in the last 72 hours. Procedures: None  Hospital Course: 63 y.o. female with a past medical history of hypertension GERD who presented to the ED complaining of AMS that has been present for the last few days. MRI brain grossly unremarkable. Neurology consulted. EEG grossly unremarkable. Other work-up grossly unremarkable. Patient is feeling back to her normal self and is currently hemodynamically stable for discharge home today to follow-up with PCP and neurology as an outpatient. Home health has been ordered for the patient. She was noted to have a positive bubble study with Valsalva on TTE and she has been recommended to follow-up with cardiology as an outpatient for further evaluation.     Physical Exam:  Vitals: BP (!) 100/54   Pulse 61   Temp 97 °F (36.1 °C) (Temporal)   Resp 14   Ht 5' 4\" (1.626 m)   Wt 156 lb 4 oz (70.9 kg)   SpO2 99%   BMI 26.82 kg/m²   24HR INTAKE/OUTPUT:      Intake/Output Summary (Last 24 hours) at 8/2/2021 0900  Last data filed at 8/2/2021 0540  Gross per 24 hour   Intake 2202.39 ml   Output 2150 ml   Net 52.39 ml     General appearance: Alert  Head: NC/AT  Eyes: conjunctivae/corneas clear   Ears: normal external ears  Neck: Supple  Lungs: BLAE, no wheezing appreciated   Heart: RRR, no murmurs appreciated  Abdomen: BS+, soft, NT, ND  Extremities: no edema, pulses+  Skin: warm  Neurologic: AAOx3 (person, place, time), gross motor function intact  Psychiatric:  calm, pleasant    Discharge Medications: Rick Martinezwith   Home Medication Instructions EFQ:[id number]    Printed on:08/02/21 0900   Medication Information                      aspirin 81 MG chewable tablet  Take 1 tablet by mouth daily             atorvastatin (LIPITOR) 80 MG tablet  Take 1 tablet by mouth nightly             gabapentin (NEURONTIN) 600 MG tablet  Take 600 mg by mouth 2 times daily. omeprazole (PRILOSEC) 40 MG delayed release capsule  Take 40 mg by mouth daily             ondansetron (ZOFRAN) 4 MG tablet  Take 1 tablet by mouth every 8 hours as needed for Nausea or Vomiting             sodium bicarbonate 650 MG tablet  Take 1 tablet by mouth 3 times daily for 7 days             sucralfate (CARAFATE) 1 GM tablet  Take 1 tablet by mouth 4 times daily                 Discharge Instructions: Follow up with Edson Tesfaye in 7 days. Take medications as directed. Resume activity as tolerated. Diet: ADULT DIET; Regular     Disposition: Patient is medically stable and will be discharged Home. Time spent on discharge 35 minutes.     Signed:  Stacie Peck MD 8/2/2021 9:00 AM

## 2021-08-02 NOTE — DISCHARGE INSTR - DIET

## 2021-08-03 ENCOUNTER — TELEPHONE (OUTPATIENT)
Dept: NEUROLOGY | Age: 61
End: 2021-08-03

## 2021-08-03 NOTE — TELEPHONE ENCOUNTER
Scotty Andrade called requesting to have echo results faxed to dr. Radha Wu in [place]. Fax to : [phone]. Per pt seen at ER on 8/2, was advised that lety ordered test. Please call pt advise.

## 2021-08-03 NOTE — TELEPHONE ENCOUNTER
This is not a patient in our practice. Dr Carolin Hedrick was consulted during the patient's recent admission, but patient has never been seen by our office and thus far has no upcoming appt with Neurology. This test was completed at the hospital, during an admission, and the ordering provider is not listed as Dr Carolin Hedrick, it states on the order that Dr Kimberly Kapadia ordered the echo. 1- I cannot forward or release \"hospital records\" to anyone for any reason when this is not a patient that we see in our practice regardless of who the ordering provider is. 2-Because this was completed at the hospital and during an admission the records request MUST go through the medical records department and not our out patient clinic. I called and explained to the patient that the request will need to go through the hospital medical records department, I stated that if she has an appointment with Dr Dianne Mark his staff will be able to have her sign the appropriate release and get that faxed over to medical records. I apologized for the confusion and for not being able to complete the request today. The patient voiced understanding and stated she has an appointment for hfu with her pcp on Thursday and asked if their office could forward a signed records release to medical records at the hospital to obtain a copy of all of the recent admission records, I stated that they could do that as well. Patient voiced understanding and states she will sign a release and have her pcp forward that to the medical records department to be processed.

## 2021-08-06 LAB
EKG P AXIS: -2 DEGREES
EKG P-R INTERVAL: 128 MS
EKG Q-T INTERVAL: 412 MS
EKG QRS DURATION: 102 MS
EKG QTC CALCULATION (BAZETT): 415 MS
EKG T AXIS: 27 DEGREES

## (undated) DEVICE — FORCEPS BX L240CM JAW DIA2.4MM ORNG L CAP W/ NDL DISP RAD

## (undated) DEVICE — BANDAGE,GAUZE,CONFORMING,4"X75",STRL,LF: Brand: MEDLINE

## (undated) DEVICE — ARM BOARD PAD: Brand: DEVON

## (undated) DEVICE — DRESSING TRNSPAR W5XL4.5IN FLM SHT SEMIPERMEABLE WIND

## (undated) DEVICE — CANNULA ARTHSCP L7CM DIA7MM TRNSLUC THRD FLX W/ NO SQUIRT

## (undated) DEVICE — 60 ML SYRINGE LUER-LOCK TIP: Brand: MONOJECT

## (undated) DEVICE — 3M™ IOBAN™ 2 ANTIMICROBIAL INCISE DRAPE 6650EZ: Brand: IOBAN™ 2

## (undated) DEVICE — SHOULDER STABILIZATION KIT,                                    DISPOSABLE 12 PER BOX

## (undated) DEVICE — PAD,EYE,1-5/8X2 5/8,STERILE,LF,1/PK: Brand: MEDLINE

## (undated) DEVICE — SUTURE TIGERTAPE TIGERWIRE SZ 2-0 L30IN NONABSORBABLE AR72377T

## (undated) DEVICE — T-MAX DISPOSABLE FACE MASK 8 PER BOX

## (undated) DEVICE — SUTURE FIBERWIRE SZ 2 L38IN NONABSORBABLE BLU WHT BLK AR7201

## (undated) DEVICE — GLOVE SURG SZ 8 CRM LTX FREE POLYISOPRENE POLYMER BEAD ANTI

## (undated) DEVICE — 90-S XL PROBE, SUCTION, NON-BENDABLE, MAX CUT LEVEL 11: Brand: SERFAS ENERGY

## (undated) DEVICE — ENDO KIT,LOURDES HOSPITAL: Brand: MEDLINE INDUSTRIES, INC.

## (undated) DEVICE — SHEET,DRAPE,53X77,STERILE: Brand: MEDLINE

## (undated) DEVICE — NEEDLE SUT PASS FOR ROT CUF LABRAL REP SUREFIRE SCORPION

## (undated) DEVICE — STERILE LATEX POWDER FREE SURGICAL GLOVES WITH HYDROGEL COATING: Brand: PROTEXIS

## (undated) DEVICE — STERILE POLYISOPRENE POWDER-FREE SURGICAL GLOVES: Brand: PROTEXIS

## (undated) DEVICE — 90-S MAX, SUCTION PROBE, NON-BENDABLE, MAX CUT LEVEL 11: Brand: SERFAS ENERGY

## (undated) DEVICE — [AGGRESSIVE PLUS CUTTER, ARTHROSCOPIC SHAVER BLADE,  DO NOT RESTERILIZE,  DO NOT USE IF PACKAGE IS DAMAGED,  KEEP DRY,  KEEP AWAY FROM SUNLIGHT]: Brand: FORMULA

## (undated) DEVICE — DRAPE,SHOULDER,BEACH CHAIR,STERILE: Brand: MEDLINE

## (undated) DEVICE — SUTURE MCRYL SZ 3 0 L18IN ABSRB UD PS 2 3 8 CIR REV CUT NDL MCP497G

## (undated) DEVICE — 3M™ STERI-STRIP™ REINFORCED ADHESIVE SKIN CLOSURES, R1547, 1/2 IN X 4 IN (12 MM X 100 MM), 6 STRIPS/ENVELOPE: Brand: 3M™ STERI-STRIP™

## (undated) DEVICE — Z INACTIVE USE 2660664 SOLUTION IRRIG 3000ML 0.9% SOD CHL USP UROMATIC PLAS CONT

## (undated) DEVICE — SHOULDER CDS

## (undated) DEVICE — INTEGRATED CASSETTE TUBING, DO NOT USE IF PACKAGE IS DAMAGED: Brand: CROSSFLOW

## (undated) DEVICE — CHLORAPREP 26ML ORANGE